# Patient Record
Sex: FEMALE | Race: WHITE | NOT HISPANIC OR LATINO | Employment: FULL TIME | URBAN - METROPOLITAN AREA
[De-identification: names, ages, dates, MRNs, and addresses within clinical notes are randomized per-mention and may not be internally consistent; named-entity substitution may affect disease eponyms.]

---

## 2020-07-14 ENCOUNTER — OFFICE VISIT (OUTPATIENT)
Dept: FAMILY MEDICINE | Facility: CLINIC | Age: 40
End: 2020-07-14
Payer: COMMERCIAL

## 2020-07-14 VITALS
WEIGHT: 275 LBS | BODY MASS INDEX: 41.68 KG/M2 | SYSTOLIC BLOOD PRESSURE: 138 MMHG | HEART RATE: 79 BPM | OXYGEN SATURATION: 99 % | HEIGHT: 68 IN | DIASTOLIC BLOOD PRESSURE: 88 MMHG | TEMPERATURE: 99 F

## 2020-07-14 DIAGNOSIS — Z12.4 SCREENING FOR MALIGNANT NEOPLASM OF CERVIX: ICD-10-CM

## 2020-07-14 DIAGNOSIS — Z11.3 SCREENING FOR STD (SEXUALLY TRANSMITTED DISEASE): ICD-10-CM

## 2020-07-14 DIAGNOSIS — Z00.00 PREVENTATIVE HEALTH CARE: Primary | ICD-10-CM

## 2020-07-14 PROCEDURE — 99385 PR PREVENTIVE VISIT,NEW,18-39: ICD-10-PCS | Mod: S$GLB,,, | Performed by: NURSE PRACTITIONER

## 2020-07-14 PROCEDURE — 99385 PREV VISIT NEW AGE 18-39: CPT | Mod: S$GLB,,, | Performed by: NURSE PRACTITIONER

## 2020-07-14 RX ORDER — CETIRIZINE HYDROCHLORIDE 10 MG/1
10 TABLET, CHEWABLE ORAL DAILY
COMMUNITY
End: 2021-12-21

## 2020-07-14 RX ORDER — BUDESONIDE AND FORMOTEROL FUMARATE DIHYDRATE 80; 4.5 UG/1; UG/1
2 AEROSOL RESPIRATORY (INHALATION) DAILY
COMMUNITY
Start: 2020-06-25

## 2020-07-14 RX ORDER — ALBUTEROL SULFATE 90 UG/1
2 AEROSOL, METERED RESPIRATORY (INHALATION) EVERY 4 HOURS PRN
COMMUNITY

## 2020-07-14 NOTE — PROGRESS NOTES
SUBJECTIVE:      Patient ID: Noemi Huang is a 40 y.o. female.    Chief Complaint: Establish Care (std testing)    New patient here to establish care with me today-she is well known to me from my previous clinic.  She has a history of asthma, bipolar 1 disorder and environmental allergies.  She is currently seeing a pulmonologist at  for management of her asthma and is well controlled on her regimen.  She is here today for annual exam and request for STD screening.  She just recently found out her  was unfaithful to her about 6 weeks ago and she is unsure if he use protection or not with his female partner.  She denies any current symptoms or complaints but just wants to be checked for reassurance.  She is also requesting a referral to gynecology to establish care and for screening Pap smear.  She denies any current medicine regimen for her bipolar disorder.  She stopped taking lithium at the end of  and is not currently desiring to restart any medications.  She is also due for routine lab work at this time.      Family History   Problem Relation Age of Onset    Diabetes Mother     Hypertension Father       Social History     Socioeconomic History    Marital status:      Spouse name: Not on file    Number of children: Not on file    Years of education: Not on file    Highest education level: Not on file   Occupational History    Not on file   Social Needs    Financial resource strain: Not on file    Food insecurity     Worry: Not on file     Inability: Not on file    Transportation needs     Medical: Not on file     Non-medical: Not on file   Tobacco Use    Smoking status: Former Smoker     Quit date: 2004     Years since quittin.0    Smokeless tobacco: Never Used   Substance and Sexual Activity    Alcohol use: Not Currently    Drug use: Never    Sexual activity: Not on file   Lifestyle    Physical activity     Days per week: Not on file     Minutes per session:  Not on file    Stress: Not on file   Relationships    Social connections     Talks on phone: Not on file     Gets together: Not on file     Attends Denominational service: Not on file     Active member of club or organization: Not on file     Attends meetings of clubs or organizations: Not on file     Relationship status: Not on file   Other Topics Concern    Not on file   Social History Narrative    Not on file     Current Outpatient Medications   Medication Sig Dispense Refill    albuterol (PROAIR HFA) 90 mcg/actuation inhaler ProAir HFA 90 mcg/actuation aerosol inhaler   Inhale 2 puffs every 4 hours by inhalation route as needed.      cetirizine 10 mg chewable tablet Take 10 mg by mouth once daily.      SYMBICORT 80-4.5 mcg/actuation HFAA INHALE 2 PUFFS BY MOTH TWO TIMES A DAY IN THE MORNING AND EVENING. RINSE MOUTH AND THROAT AFTER USE       No current facility-administered medications for this visit.      Review of patient's allergies indicates:   Allergen Reactions    Penicillins Anaphylaxis      Past Medical History:   Diagnosis Date    Allergy     Asthma      Past Surgical History:   Procedure Laterality Date     SECTION      KNEE SURGERY         Review of Systems   Constitutional: Negative for appetite change, chills, fatigue, fever and unexpected weight change.   HENT: Negative for congestion, ear discharge, ear pain, rhinorrhea, sore throat and trouble swallowing.    Eyes: Negative for pain and visual disturbance.   Respiratory: Positive for shortness of breath (intermittent SOB - has asthma ). Negative for cough, chest tightness and wheezing.    Cardiovascular: Negative for chest pain, palpitations and leg swelling.   Gastrointestinal: Negative for abdominal pain, blood in stool, constipation, diarrhea, nausea and vomiting.   Endocrine: Negative for cold intolerance, heat intolerance, polydipsia and polyuria.   Genitourinary: Negative for dysuria, frequency, genital sores, hematuria,  "menstrual problem, pelvic pain, urgency, vaginal bleeding and vaginal discharge.   Musculoskeletal: Negative for arthralgias, back pain, myalgias and neck pain.   Skin: Negative for pallor and rash.   Allergic/Immunologic: Positive for environmental allergies.   Neurological: Negative for dizziness, syncope, weakness and headaches.   Hematological: Negative for adenopathy. Does not bruise/bleed easily.   Psychiatric/Behavioral: Positive for dysphoric mood (hx of Bipolar d/o- not currently on meds ). Negative for sleep disturbance and suicidal ideas. The patient is not nervous/anxious.       OBJECTIVE:      Vitals:    07/14/20 1016   BP: 138/88   BP Location: Left arm   Patient Position: Sitting   BP Method: Large (Manual)   Pulse: 79   Temp: 98.7 °F (37.1 °C)   SpO2: 99%   Weight: 124.7 kg (275 lb)   Height: 5' 7.5" (1.715 m)     Physical Exam  Vitals signs and nursing note reviewed.   Constitutional:       General: She is not in acute distress.     Appearance: She is well-developed. She is obese. She is not diaphoretic.   HENT:      Head: Normocephalic and atraumatic.      Right Ear: Tympanic membrane, ear canal and external ear normal.      Left Ear: Tympanic membrane, ear canal and external ear normal.      Nose: Nose normal.      Mouth/Throat:      Mouth: Mucous membranes are moist.      Pharynx: Oropharynx is clear. No oropharyngeal exudate.   Eyes:      General: No scleral icterus.     Extraocular Movements: Extraocular movements intact.      Conjunctiva/sclera: Conjunctivae normal.      Pupils: Pupils are equal, round, and reactive to light.   Neck:      Musculoskeletal: Normal range of motion and neck supple. No neck rigidity.      Thyroid: No thyroid mass or thyromegaly.   Cardiovascular:      Rate and Rhythm: Normal rate and regular rhythm.      Heart sounds: Normal heart sounds. No murmur.   Pulmonary:      Effort: Pulmonary effort is normal. No respiratory distress.      Breath sounds: Normal breath " sounds. No wheezing or rales.   Abdominal:      General: Bowel sounds are normal. There is no distension.      Palpations: Abdomen is soft. There is no mass.      Tenderness: There is no abdominal tenderness.      Hernia: No hernia is present.   Genitourinary:     Comments: Deferred to gyn   Musculoskeletal: Normal range of motion.      Right lower leg: No edema.      Left lower leg: No edema.   Lymphadenopathy:      Cervical: No cervical adenopathy.   Skin:     General: Skin is warm and dry.      Coloration: Skin is not pale.      Findings: No rash.   Neurological:      General: No focal deficit present.      Mental Status: She is alert and oriented to person, place, and time.   Psychiatric:         Mood and Affect: Mood normal.         Behavior: Behavior normal.         Thought Content: Thought content normal.         Judgment: Judgment normal.        Assessment:       1. Preventative health care    2. Screening for STD (sexually transmitted disease)    3. Screening for malignant neoplasm of cervix    4. BMI 40.0-44.9, adult        Plan:       Preventative health care  -     CBC auto differential; Future; Expected date: 07/14/2020  -     Comprehensive metabolic panel; Future; Expected date: 07/14/2020  -     Lipid Panel; Future; Expected date: 07/14/2020  -     TSH; Future; Expected date: 07/14/2020    Screening for STD (sexually transmitted disease)  -     Hepatitis Panel, Acute; Future; Expected date: 07/14/2020  -     HIV 1/2 Ag/Ab (4th Gen); Future; Expected date: 07/14/2020  -     RPR; Future; Expected date: 07/14/2020  -     CT/NG, T. vaginalis; Future; Expected date: 07/14/2020    Screening for malignant neoplasm of cervix  -     Ambulatory referral/consult to Obstetrics / Gynecology; Future; Expected date: 07/21/2020    BMI 40.0-44.9, adult      *enouraged her to get established with a local counselor due to recent events in her marriage and to follow-up with me if she has any changes in her mental health      Follow up in about 1 year (around 7/14/2021) for annual .      7/14/2020 NILSON Alvarado, FNP

## 2020-07-14 NOTE — PATIENT INSTRUCTIONS
Adult Immunization Schedule  Vaccine How Often Disease Prevented Who Needs It   Influenza Every year Flu. This can be especially dangerous to elderly adults or people with immune disorders. All adults   Tetanus, diphtheria (Td); or Tetanus, diphtheria, and pertussis (Tdap)* One dose of Tdap, then one dose of Td as a booster every 10 years Tetanus (lockjaw), a disease that causes muscles to spasm  Diphtheria, an infection that causes fever, weakness, and breathing problems  Pertussis, also known as whooping cough. This is a highly contagious disease that can cause serious illness. All adults  *This vaccine should be given during each pregnancy no matter how many years since the last vaccine. The vaccine increases protection for your . A  is too young to get the vaccine, but at the highest risk for severe illness and death from pertussis.   Varicella (Kishan)** One series of 2 injections Chickenpox. This is a disease that causes itchy skin bumps, fever, and tiredness. It can lead to scarring, pneumonia, or brain inflammation. Adults who dont have evidence of immunity  **This vaccine should not be given to pregnant women. Women should avoid pregnancy for 4 weeks after the vaccine.   Human papillomavirus (HPV) One series of 3 injections Cervical cancer, caused by certain types of HPV  Vaginal and vulvar cancer  Penile cancer  Head and neck cancers  Anal cancer  Genital warts Females and males ages 15 to 26  Ask your healthcare provider if this vaccine is right for you.   Zoster--- 1 dose Herpes zoster (shingles), a painful rash marked by blisters Adults ages 60 and older.  ---You should not get this vaccine if your immune system is low. For example, if you have HIV, are taking medicines that suppress your immune system, or are getting cancer treatment.   Measles, mumps, rubella (MMR)** 1 or 2 doses, for ages 19 through 49; 1 dose for ages 50 and older if at risk Measles, a disease marked by red spots,  fever, and coughing  Mumps, a disease that causes swelling in the salivary glands. It may affect the ovaries or testes.  Rubella ([place]vian measles). This is a form of measles that can cause birth defects if a pregnant woman catches it. Adults born in 1957 or later who are not known to be immune to all 3 of these diseases. Ask your healthcare provider if you need a second dose.  **This vaccine should not be given to pregnant women. Women should avoid pregnancy for 4 weeks after vaccination.   Pneumococcal (PCV 13) 1 dose Pneumonia. This is an infection that causes inflammation in your lungs. It can lead to death. Adults ages 65 and older. Also, adults ages 19 and older with weak immune systems or any of these health conditions: chronic renal failure, nephrotic syndrome, functional or anatomic asplenia, cerebrospinal fluid (CSF) leaks, or cochlear implants. This vaccine adds extra protection to PCV 23 and should be given about 2 months before PCV 23.   Pneumococcal (PPSV23)  1 or 2 doses Pneumonia. This is an infection that causes inflammation in your lungs. It can lead to death. Adults ages 65 and older. Also, adults with chronic illnesses, such as asthma, COPD, heart disease, diabetes, liver disease, alcoholism, sickle cell disease, or history of splenectomy. In addition, adults with an immune disorder and adults who smoke cigarettes. This vaccine is recommended for all adults regardless of immune status.   Meningococcal  (MCV or MPSV) 1 or more doses Meningococcal disease (bacterial meningitis). This is inflammation of the membrane covering the brain and spinal cord. It can lead to death. Adults with immune deficiencies or high risk of exposure. Also, college freshmen living in dormitories and  recruits.   Hepatitis A (HepA) One series of 2 injections Hepatitis A. This is an infection that can result in acute liver inflammation and yellow skin and whites of the eyes (jaundice). Adults with risk factors, such  as clotting disorders or chronic liver disease, and adults with high risk of exposure. This includes men who have sex with men, IV drug users, and travelers to countries where hepatitis A is common.   Hepatitis B (HepB) One series of 3 injections Hepatitis B. This is an infection that causes chronic, severe liver disease. Adults with high risk of exposure, such as healthcare providers and sanitation workers, and adults with diabetes   Travelers diseases As needed Infections such as cholera, typhoid, yellow fever, polio, rabies, meningococcal disease, hepatitis A, hepatitis B Adults traveling out of the country. Required vaccines will vary, depending on the country you visit. Check the CDC website: www.cdc.gov.    ,  Based on the CDC National Immunization Program recommendations for adults.  Date Last Reviewed: 2/1/2017 © 2000-2017 [employer]. [address] [place] [address], [place] [address]. All rights reserved. This information is not intended as a substitute for professional medical care. Always follow your healthcare professional's instructions.        Prevention Guidelines, Women Ages 18 to 39  Screening tests and vaccines are an important part of managing your health. Health counseling is essential, too. Below are guidelines for these, for women ages 18 to 39. Talk with your healthcare provider to make sure youre up-to-date on what you need.  Screening Who needs it How often   Alcohol misuse All women in this age group At routine exams   Blood pressure All women in this age group Every 2 years if your blood pressure is less than 120/80 mm Hg; yearly if your systolic blood pressure is 120 to 139 mm Hg, or your diastolic blood pressure reading is 80 to 89 mm Hg   Breast cancer All women in this age group should talk with their healthcare providers about the need for clinical breast exams (CBE)1 Clinical breast exam every 3 years1   Cervical cancer Women ages 21 and older Women between ages 21 and 29  should have a Pap test every 3 years; women between ages 30 and 65 are advised to have a Pap test plus an HPV test every 5 years   Chlamydia Sexually active women ages 24 and younger, and women at increased risk for infection Every 3 years if you're at risk or have symptoms   Depression All women in this age group At routine exams   Diabetes mellitus, type 2 Adults with no symptoms who are overweight or obese and have 1 or more other risk factors for diabetes At least every 3 years   Gonorrhea Sexually active women at increased risk for infection At routine exams   Hepatitis C Anyone at increased risk At routine exams   HIV All women At routine exams   Obesity All women in this age group At routine exams   Syphilis Women at increased risk for infection - talk with your healthcare provider At routine exams   Tuberculosis Women at increased risk for infection - talk with your healthcare provider Ask your healthcare provider   Vision All women in this age group At least 1 complete exam in your 20s, and 2 in your 30s   Vaccine2 Who needs it How often   Chickenpox (varicella) All women in this age group who have no record of this infection or vaccine 2 doses; the second dose should be given 4 to 8 weeks after the first dose   Hepatitis A Women at increased risk for infection - talk with your healthcare provider 2 doses given at least 6 months apart   Hepatitis B Women at increased risk for infection - talk with your healthcare provider 3 doses over 6 months; second dose should be given 1 month after the first dose; the third dose should be given at least 2 months after the second dose and at least 4 months after the first dose   Haemophilus influenzae Type B (HIB) Women at increased risk for infection - talk with your healthcare provider 1 to 3 doses   Human papillomavirus (HPV) All women in this age group up to age 26 3 doses; the second dose should be given 1 to 2 months after the first dose and the third dose given 6  months after the first dose   Influenza (flu) All women in this age group Once a year   Measles, mumps, rubella (MMR) All women in this age group who have no record of these infections or vaccines 1 or 2 doses   Meningococcal Women at increased risk for infection - talk with your healthcare provider 1 or more doses   Pneumococcal conjugate vaccine (PCV13) and pneumococcal polysaccharide vaccine (PPSV23) Women at increased risk for infection - talk with your healthcare provider PCV13: 1 dose ages 19 to 65 (protects against 13 types of pneumococcal bacteria)  PPSV23: 1 to 2 doses through age 64, or 1 dose at 65 or older (protects against 23 types of pneumococcal bacteria)      Tetanus/diphtheria/pertussis (Td/Tdap) booster All women in this age group Td every 10 years, or a one-time dose of Tdap instead of a Td booster after age 18, then Td every 10 years   Counseling Who needs it How often   BRCA gene mutation testing for breast and ovarian cancer susceptibility Women with increased risk for having gene mutation When your risk is known   Breast cancer and chemoprevention Women at high risk for breast cancer When your risk is known   Diet and exercise Women who are overweight or obese When diagnosed, and then at routine exams   Domestic violence Women at the age in which they are able to have children At routine exams   Sexually transmitted infection prevention Women who are sexually active At routine exams   Skin cancer Prevention of skin cancer in fair-skinned adults through age 24 At routine exams   Use of tobacco and the health effects it can cause All women in this age group Every visit   1According to the ACS, women ages 20 to 39 years should have a clinical breast exam (CBE) as part of their routine health exam every 3 years. Breast self-exams are an option for women starting in their 20s. But the  USPSTF does not recommend CBE.  2Those who are 18 years old and not up-to-date on their childhood vaccines should  get all appropriate catch-up vaccines recommended by the CDC.  Date Last Reviewed: 8/27/2015  © 1260-9996 [employer]. [address] [place] [address], [place] [address]. All rights reserved. This information is not intended as a substitute for professional medical care. Always follow your healthcare professional's instructions.

## 2020-07-25 LAB
ALBUMIN SERPL-MCNC: 4.5 G/DL (ref 3.8–4.8)
ALBUMIN/GLOB SERPL: 2.1 {RATIO} (ref 1.2–2.2)
ALP SERPL-CCNC: 86 IU/L (ref 39–117)
ALT SERPL-CCNC: 10 IU/L (ref 0–32)
AST SERPL-CCNC: 13 IU/L (ref 0–40)
BASOPHILS # BLD AUTO: 0.1 X10E3/UL (ref 0–0.2)
BASOPHILS NFR BLD AUTO: 1 %
BILIRUB SERPL-MCNC: 0.2 MG/DL (ref 0–1.2)
BUN SERPL-MCNC: 14 MG/DL (ref 6–20)
BUN/CREAT SERPL: 19 (ref 9–23)
C TRACH RRNA SPEC QL NAA+PROBE: NEGATIVE
CALCIUM SERPL-MCNC: 9.4 MG/DL (ref 8.7–10.2)
CHLORIDE SERPL-SCNC: 100 MMOL/L (ref 96–106)
CHOLEST SERPL-MCNC: 182 MG/DL (ref 100–199)
CO2 SERPL-SCNC: 22 MMOL/L (ref 20–29)
CREAT SERPL-MCNC: 0.72 MG/DL (ref 0.57–1)
EOSINOPHIL # BLD AUTO: 0.1 X10E3/UL (ref 0–0.4)
EOSINOPHIL NFR BLD AUTO: 1 %
ERYTHROCYTE [DISTWIDTH] IN BLOOD BY AUTOMATED COUNT: 15.7 % (ref 11.7–15.4)
GLOBULIN SER CALC-MCNC: 2.1 G/DL (ref 1.5–4.5)
GLUCOSE SERPL-MCNC: 97 MG/DL (ref 65–99)
HAV IGM SERPL QL IA: NEGATIVE
HBV CORE IGM SERPL QL IA: NEGATIVE
HBV SURFACE AG SERPL QL IA: NEGATIVE
HCT VFR BLD AUTO: 36.8 % (ref 34–46.6)
HCV AB S/CO SERPL IA: <0.1 S/CO RATIO (ref 0–0.9)
HDLC SERPL-MCNC: 38 MG/DL
HGB BLD-MCNC: 11.7 G/DL (ref 11.1–15.9)
HIV 1+2 AB+HIV1 P24 AG SERPL QL IA: NON REACTIVE
IMM GRANULOCYTES # BLD AUTO: 0 X10E3/UL (ref 0–0.1)
IMM GRANULOCYTES NFR BLD AUTO: 0 %
LDLC SERPL CALC-MCNC: 121 MG/DL (ref 0–99)
LYMPHOCYTES # BLD AUTO: 2.7 X10E3/UL (ref 0.7–3.1)
LYMPHOCYTES NFR BLD AUTO: 34 %
MCH RBC QN AUTO: 24.7 PG (ref 26.6–33)
MCHC RBC AUTO-ENTMCNC: 31.8 G/DL (ref 31.5–35.7)
MCV RBC AUTO: 78 FL (ref 79–97)
MONOCYTES # BLD AUTO: 0.5 X10E3/UL (ref 0.1–0.9)
MONOCYTES NFR BLD AUTO: 7 %
N GONORRHOEA RRNA SPEC QL NAA+PROBE: NEGATIVE
NEUTROPHILS # BLD AUTO: 4.6 X10E3/UL (ref 1.4–7)
NEUTROPHILS NFR BLD AUTO: 57 %
PLATELET # BLD AUTO: 402 X10E3/UL (ref 150–450)
POTASSIUM SERPL-SCNC: 4.7 MMOL/L (ref 3.5–5.2)
PROT SERPL-MCNC: 6.6 G/DL (ref 6–8.5)
RBC # BLD AUTO: 4.73 X10E6/UL (ref 3.77–5.28)
RPR SER QL: NON REACTIVE
SODIUM SERPL-SCNC: 138 MMOL/L (ref 134–144)
T VAGINALIS DNA SPEC QL NAA+PROBE: NEGATIVE
TRIGL SERPL-MCNC: 113 MG/DL (ref 0–149)
TSH SERPL DL<=0.005 MIU/L-ACNC: 1.04 UIU/ML (ref 0.45–4.5)
VLDLC SERPL CALC-MCNC: 23 MG/DL (ref 5–40)
WBC # BLD AUTO: 8 X10E3/UL (ref 3.4–10.8)

## 2020-08-13 ENCOUNTER — HOSPITAL ENCOUNTER (OUTPATIENT)
Dept: RADIOLOGY | Facility: CLINIC | Age: 40
Discharge: HOME | End: 2020-08-13
Attending: PODIATRIST
Payer: COMMERCIAL

## 2020-08-13 ENCOUNTER — OFFICE VISIT (OUTPATIENT)
Dept: PODIATRY | Facility: CLINIC | Age: 40
End: 2020-08-13
Payer: COMMERCIAL

## 2020-08-13 VITALS
DIASTOLIC BLOOD PRESSURE: 78 MMHG | HEART RATE: 82 BPM | HEIGHT: 68 IN | SYSTOLIC BLOOD PRESSURE: 134 MMHG | BODY MASS INDEX: 41.68 KG/M2 | WEIGHT: 275 LBS | TEMPERATURE: 98 F

## 2020-08-13 DIAGNOSIS — S90.31XA CONTUSION OF RIGHT FOOT, INITIAL ENCOUNTER: ICD-10-CM

## 2020-08-13 DIAGNOSIS — M79.671 RIGHT FOOT PAIN: ICD-10-CM

## 2020-08-13 DIAGNOSIS — S99.921A INJURY OF RIGHT FOOT, INITIAL ENCOUNTER: Primary | ICD-10-CM

## 2020-08-13 DIAGNOSIS — S93.601A SPRAIN OF RIGHT FOOT, INITIAL ENCOUNTER: ICD-10-CM

## 2020-08-13 DIAGNOSIS — M72.2 PLANTAR FASCIITIS: ICD-10-CM

## 2020-08-13 PROCEDURE — 3008F BODY MASS INDEX DOCD: CPT | Mod: S$GLB,,, | Performed by: PODIATRIST

## 2020-08-13 PROCEDURE — 99203 OFFICE O/P NEW LOW 30 MIN: CPT | Mod: S$GLB,,, | Performed by: PODIATRIST

## 2020-08-13 PROCEDURE — 3008F PR BODY MASS INDEX (BMI) DOCUMENTED: ICD-10-PCS | Mod: S$GLB,,, | Performed by: PODIATRIST

## 2020-08-13 PROCEDURE — 73630 X-RAY EXAM OF FOOT: CPT | Mod: RT,S$GLB,, | Performed by: PODIATRIST

## 2020-08-13 PROCEDURE — 73630 XR FOOT COMPLETE 3 VIEW RIGHT: ICD-10-PCS | Mod: RT,S$GLB,, | Performed by: PODIATRIST

## 2020-08-13 PROCEDURE — 99203 PR OFFICE/OUTPT VISIT, NEW, LEVL III, 30-44 MIN: ICD-10-PCS | Mod: S$GLB,,, | Performed by: PODIATRIST

## 2020-08-13 RX ORDER — MELOXICAM 15 MG/1
15 TABLET ORAL DAILY
Qty: 30 TABLET | Refills: 1 | Status: SHIPPED | OUTPATIENT
Start: 2020-08-13 | End: 2021-12-21

## 2020-08-13 NOTE — PATIENT INSTRUCTIONS
Plantar Fasciitis  Plantar fasciitis is a painful swelling of the plantar fascia. The plantar fascia is a thick, fibrous layer of tissue. It covers the bones on the bottom of your foot. And it supports the foot bones in an arched position.  This can happen gradually or suddenly. It usually affects one foot at a time. Heel pain can be sharp, like a knife sticking into the bottom of your foot. You may feel pain after exercising, long-distance jogging, stair climbing, long periods of standing, or after standing up.  Risk factors include: non-active lifestyle, arthritis, diabetes, obesity or recent weight gain, flat foot, high arch. Wearing high heels, loose shoes, or shoes with poor arch support for long periods of time adds to the risk. This problem is commonly found in runners and dancers. It also found in people who stand on hard surfaces for long periods of time.  Foot pain from this condition is usually worse in the morning. But it improves with walking. By the end of the day there may be a dull aching. Treatment requires short-term rest and controlling swelling. It may take up to 9 months before all symptoms go away. Rarely, a steroid injection into the foot, or surgery, may be needed.  Home care  · If you are overweight, lose weight to help healing.  · Choose supportive shoes with good arch support and shock absorbency. Replace athletic shoes when they become worn out. Dont walk or run barefoot.  · Premade or custom-fitted shoe inserts may be helpful. Inserts made of silicone seem to be the most effective. Custom-made inserts can be provided by a podiatrist or foot specialist, physical therapist, or orthopedist.  · Premade or custom-made night splints keep the heel stretched out while you sleep. They may prevent morning pain.  · Avoid activities that stress the feet: jogging, prolonged standing or walking, contact sports, etc.  · First thing in the morning and before sports, stretch the bottom of your feet.  Gently flex your ankle so the toes move toward your knee.  · Icing may help control heel pain. Apply an ice pack to the heel for 10-20 minutes as a preventive. Or ice your heel after a severe flare-up of symptoms. You may repeat this every 1-2 hours as needed.  · You may use over-the-counter pain medicine to control pain, unless another medicine was prescribed. Anti-inflammatory pain medicines, such as ibuprofen or naproxen, may work better than acetaminophen. If you have chronic liver or kidney disease or ever had a stomach ulcer or GI bleeding, talk with your healthcare provider before using these medicines.  Follow-up care  Follow up with your healthcare provider, physical therapist, or podiatrist or foot specialist as advised.  Call for an appointment if pain worsens or there is no relief after a few weeks of home treatment. Shoe inserts, a night splint, or a special boot may be required.  If X-rays were taken, you will be told of any new findings that may affect your care.  When to seek medical advice  Call your healthcare provider right away if any of these occur:  · Foot swelling  · Redness with increasing pain  Date Last Reviewed: 11/21/2015  © 9734-7992 "[employer] [address] [place] [address], [place] [address]. All rights reserved. This information is not intended as a substitute for professional medical care. Always follow your healthcare professional's instructions.

## 2020-08-13 NOTE — PROGRESS NOTES
[address] [facility] [address]  [place] [address]  Phone: [phone]   [phone]    Patient's PCP:LAURENT Alvarado  Referring Provider: No ref. provider found    Subjective:      Chief Complaint:: Foot Injury (right foot)    MYLES Huang is a 40 y.o. female who presents with a complaint of  Right foot pain lasting for 4-5 weeks.Pain ball of foot and heel .Onset of the symptoms was stepped out of the shower and stepped on a conditioner bottle .  Current symptoms include pain and tingling top of foot.  Aggravating factors are standing and walking, and when I push off the wall when swimming. Symptoms have have improved and gotten worse .Treatment to date have included boot cast, rest , ice and Ibuprofen and wrapping. Patients rates pain  2/10 on pain scale. At its worse 7/10.      Vitals:    20 1410   BP: 134/78   Pulse: 82   Temp: 98.4 °F (36.9 °C)     Shoe Size: 10-10.5    Past Surgical History:   Procedure Laterality Date     SECTION      KNEE SURGERY       Past Medical History:   Diagnosis Date    Allergy     Asthma      Family History   Problem Relation Age of Onset    Diabetes Mother     Hypertension Father         Social History:   Marital Status:   Alcohol History:  reports previous alcohol use.  Tobacco History:  reports that she quit smoking about 16 years ago. She has never used smokeless tobacco.  Drug History:  reports no history of drug use.    Review of patient's allergies indicates:   Allergen Reactions    Penicillins Anaphylaxis       Current Outpatient Medications   Medication Sig Dispense Refill    albuterol (PROAIR HFA) 90 mcg/actuation inhaler ProAir HFA 90 mcg/actuation aerosol inhaler   Inhale 2 puffs every 4 hours by inhalation route as needed.      cetirizine 10 mg chewable tablet Take 10 mg by mouth once daily.      meloxicam (MOBIC) 15 MG tablet Take 1 tablet (15 mg total) by mouth once daily. 30 tablet 1    SYMBICORT 80-4.5  mcg/actuation HFAA INHALE 2 PUFFS BY MOTH TWO TIMES A DAY IN THE MORNING AND EVENING. RINSE MOUTH AND THROAT AFTER USE       No current facility-administered medications for this visit.        Review of Systems   Constitutional: Negative for chills, fatigue, fever and unexpected weight change.   HENT: Negative for hearing loss and trouble swallowing.    Eyes: Negative for photophobia and visual disturbance.   Respiratory: Negative for cough, shortness of breath and wheezing.    Cardiovascular: Negative for chest pain, palpitations and leg swelling.   Gastrointestinal: Negative for abdominal pain and nausea.   Genitourinary: Negative for dysuria and frequency.   Musculoskeletal: Positive for joint swelling and myalgias. Negative for arthralgias and back pain.   Skin: Negative for rash.   Neurological: Negative for tremors, seizures, weakness, numbness and headaches.   Hematological: Does not bruise/bleed easily.         Objective:        Physical Exam:   Foot Exam    General  General Appearance: appears stated age and healthy   Orientation: alert and oriented to person, place, and time   Affect: appropriate   Gait: antalgic       Right Foot/Ankle     Inspection and Palpation  Ecchymosis: none  Tenderness: calcaneus tenderness and plantar fascia (Lisfranc joint with range of motion.  Mild pain plantar 1st metatarsal)  Swelling: none   Arch: normal  Skin Exam: skin intact;     Neurovascular  Dorsalis pedis: 2+  Posterior tibial: 2+  Saphenous nerve sensation: normal  Tibial nerve sensation: normal  Superficial peroneal nerve sensation: normal  Deep peroneal nerve sensation: normal  Sural nerve sensation: normal    Muscle Strength  Ankle dorsiflexion: 5  Ankle plantar flexion: 5  Ankle inversion: 5  Ankle eversion: 5  Great toe extension: 5  Great toe flexion: 5    Range of Motion    Normal right ankle ROM      Left Foot/Ankle      Inspection and Palpation  Ecchymosis: none  Tenderness: none   Swelling: none   Arch:  normal  Hammertoes: absent  Claw toes: absent  Hallux valgus: no  Hallux limitus: no  Skin Exam: skin intact;     Neurovascular  Dorsalis pedis: 2+  Posterior tibial: 2+  Saphenous nerve sensation: normal  Tibial nerve sensation: normal  Superficial peroneal nerve sensation: normal  Deep peroneal nerve sensation: normal  Sural nerve sensation: normal    Muscle Strength  Ankle dorsiflexion: 5  Ankle plantar flexion: 5  Ankle inversion: 5  Ankle eversion: 5  Great toe extension: 5  Great toe flexion: 5    Range of Motion    Normal left ankle ROM          Physical Exam   Cardiovascular:   Pulses:       Dorsalis pedis pulses are 2+ on the right side and 2+ on the left side.        Posterior tibial pulses are 2+ on the right side and 2+ on the left side.   Musculoskeletal:      Left foot: No bunion.        Feet:        Imaging:    AP, lateral, lateral oblique weight-bearing x-rays right foot:  No acute fractures, no bone tumors, no soft tissue masses seen.  Inferior posterior calcaneal exostosis present.  Small exostosis lateral and plantar lateral calcaneus.  Exostosis 4th metatarsal base.         Assessment:       1. Injury of right foot, initial encounter    2. Right foot pain    3. Plantar fasciitis    4. Contusion of right foot, initial encounter    5. Sprain of right foot, initial encounter      Plan:   Injury of right foot, initial encounter  -     meloxicam (MOBIC) 15 MG tablet; Take 1 tablet (15 mg total) by mouth once daily.  Dispense: 30 tablet; Refill: 1    Right foot pain  -     X-Ray Foot Complete Right  -     meloxicam (MOBIC) 15 MG tablet; Take 1 tablet (15 mg total) by mouth once daily.  Dispense: 30 tablet; Refill: 1    Plantar fasciitis  -     meloxicam (MOBIC) 15 MG tablet; Take 1 tablet (15 mg total) by mouth once daily.  Dispense: 30 tablet; Refill: 1    Contusion of right foot, initial encounter    Sprain of right foot, initial encounter     1 evaluate the patient's crown my clinical findings and  x-ray findings to her.  I explained her believe she has soft tissue injury from stepping on the bottle when she stepped out of the shower.  I believe she is inflamed of plantar fascia and her plantar musculature.  She does have some pain and Lisfranc joint with some range of motion some mild changes on x-ray.  She does have pain at the insertion of the plantar fashion heel.  Decide to treat this as plantar fasciitis and will do plantar fasciitis level 1 treatment.  If she does not have good improvement in 4 weeks she return to see me for Cam Walker boot cast.  2.Plantar Fasciitis Level I Treatment:   Anti-inflammatory Mobic 15 mg 1 pill once a day  No bare feet  The patient has custom-made orthotics for from another podiatrist in [place] which she will use  Restrict activity level   Use running shoes at full time  No impact exercise and stretch gastrocnemius muscle  Return in 4 weeks if not at least 30-40% improvement.  Follow up if symptoms worsen or fail to improve.    Procedures - None    Counseling:   Discussed different treatment options for heel pain. I gave written and verbal instructions on heel cord stretching and this was demonstrated for the patient. Patient expressed understanding. Discussed wearing appropriate shoe gear and avoiding flats, slippers, sandals, barefoot, and sockfeet. Recommended arch supports. My recommendation for OTC supports is Spenco polysorb replacement insoles or patient may elect more aggressive treatment with prescription arch supports. We also discussed cortisone injections and NSAID therapy.     I provided patient education verbally regarding:   Patient diagnosis, treatment options, as well as alternatives, risks, and benefits.     This note was created using Dragon voice recognition software that occasionally misinterpreted phrases or words.               Answers for HPI/ROS submitted by the patient on 8/11/2020   Chronicity: recurrent  Onset: more than 1 month ago  Frequency:  daily  Progression since onset: waxing and waning  Aggravating factors: exertion, twisting, walking  Treatments tried: acetaminophen, ice, immobilization, rest  Improvement on treatment: mild

## 2021-04-27 ENCOUNTER — PATIENT MESSAGE (OUTPATIENT)
Dept: FAMILY MEDICINE | Facility: CLINIC | Age: 41
End: 2021-04-27

## 2021-12-21 ENCOUNTER — OFFICE VISIT (OUTPATIENT)
Dept: FAMILY MEDICINE | Facility: CLINIC | Age: 41
End: 2021-12-21
Payer: COMMERCIAL

## 2021-12-21 VITALS
WEIGHT: 283.63 LBS | TEMPERATURE: 98 F | HEART RATE: 72 BPM | RESPIRATION RATE: 16 BRPM | BODY MASS INDEX: 42.99 KG/M2 | HEIGHT: 68 IN | DIASTOLIC BLOOD PRESSURE: 90 MMHG | SYSTOLIC BLOOD PRESSURE: 152 MMHG | OXYGEN SATURATION: 99 %

## 2021-12-21 DIAGNOSIS — Z23 NEED FOR TETANUS BOOSTER: ICD-10-CM

## 2021-12-21 DIAGNOSIS — I10 ESSENTIAL HYPERTENSION: Primary | ICD-10-CM

## 2021-12-21 DIAGNOSIS — Z23 NEED FOR INFLUENZA VACCINATION: ICD-10-CM

## 2021-12-21 DIAGNOSIS — F41.9 ANXIETY DISORDER, UNSPECIFIED TYPE: ICD-10-CM

## 2021-12-21 PROCEDURE — 90686 FLU VACCINE (QUAD) GREATER THAN OR EQUAL TO 3YO PRESERVATIVE FREE IM: ICD-10-PCS | Mod: S$GLB,,, | Performed by: NURSE PRACTITIONER

## 2021-12-21 PROCEDURE — 90686 IIV4 VACC NO PRSV 0.5 ML IM: CPT | Mod: S$GLB,,, | Performed by: NURSE PRACTITIONER

## 2021-12-21 PROCEDURE — 99214 PR OFFICE/OUTPT VISIT, EST, LEVL IV, 30-39 MIN: ICD-10-PCS | Mod: 25,S$GLB,, | Performed by: NURSE PRACTITIONER

## 2021-12-21 PROCEDURE — 90471 IMMUNIZATION ADMIN: CPT | Mod: S$GLB,,, | Performed by: NURSE PRACTITIONER

## 2021-12-21 PROCEDURE — 90715 TDAP VACCINE 7 YRS/> IM: CPT | Mod: S$GLB,,, | Performed by: NURSE PRACTITIONER

## 2021-12-21 PROCEDURE — 90472 TDAP VACCINE GREATER THAN OR EQUAL TO 7YO IM: ICD-10-PCS | Mod: S$GLB,,, | Performed by: NURSE PRACTITIONER

## 2021-12-21 PROCEDURE — 90472 IMMUNIZATION ADMIN EACH ADD: CPT | Mod: S$GLB,,, | Performed by: NURSE PRACTITIONER

## 2021-12-21 PROCEDURE — 90471 FLU VACCINE (QUAD) GREATER THAN OR EQUAL TO 3YO PRESERVATIVE FREE IM: ICD-10-PCS | Mod: S$GLB,,, | Performed by: NURSE PRACTITIONER

## 2021-12-21 PROCEDURE — 4010F ACE/ARB THERAPY RXD/TAKEN: CPT | Mod: S$GLB,,, | Performed by: NURSE PRACTITIONER

## 2021-12-21 PROCEDURE — 4010F PR ACE/ARB THEARPY RXD/TAKEN: ICD-10-PCS | Mod: S$GLB,,, | Performed by: NURSE PRACTITIONER

## 2021-12-21 PROCEDURE — 90715 TDAP VACCINE GREATER THAN OR EQUAL TO 7YO IM: ICD-10-PCS | Mod: S$GLB,,, | Performed by: NURSE PRACTITIONER

## 2021-12-21 PROCEDURE — 99214 OFFICE O/P EST MOD 30 MIN: CPT | Mod: 25,S$GLB,, | Performed by: NURSE PRACTITIONER

## 2021-12-21 RX ORDER — BUSPIRONE HYDROCHLORIDE 7.5 MG/1
7.5 TABLET ORAL 2 TIMES DAILY
Qty: 60 TABLET | Refills: 2 | Status: SHIPPED | OUTPATIENT
Start: 2021-12-21 | End: 2022-01-21 | Stop reason: SDUPTHER

## 2021-12-21 RX ORDER — OLMESARTAN MEDOXOMIL 20 MG/1
20 TABLET ORAL DAILY
Qty: 30 TABLET | Refills: 2 | Status: SHIPPED | OUTPATIENT
Start: 2021-12-21 | End: 2022-01-21 | Stop reason: SDUPTHER

## 2021-12-21 RX ORDER — AZITHROMYCIN 250 MG/1
TABLET, FILM COATED ORAL
COMMUNITY
Start: 2021-12-13 | End: 2022-01-21

## 2021-12-30 LAB
ALBUMIN SERPL-MCNC: 3.8 G/DL (ref 3.6–5.1)
ALBUMIN/GLOB SERPL: 1.4 (CALC) (ref 1–2.5)
ALP SERPL-CCNC: 88 U/L (ref 31–125)
ALT SERPL-CCNC: 16 U/L (ref 6–29)
APPEARANCE UR: CLEAR
AST SERPL-CCNC: 20 U/L (ref 10–30)
BASOPHILS # BLD AUTO: 48 CELLS/UL (ref 0–200)
BASOPHILS NFR BLD AUTO: 0.5 %
BILIRUB SERPL-MCNC: 0.5 MG/DL (ref 0.2–1.2)
BILIRUB UR QL STRIP: NEGATIVE
BUN SERPL-MCNC: 12 MG/DL (ref 7–25)
BUN/CREAT SERPL: NORMAL (CALC) (ref 6–22)
CALCIUM SERPL-MCNC: 8.8 MG/DL (ref 8.6–10.2)
CHLORIDE SERPL-SCNC: 104 MMOL/L (ref 98–110)
CHOLEST SERPL-MCNC: 165 MG/DL
CHOLEST/HDLC SERPL: 3.5 (CALC)
CO2 SERPL-SCNC: 24 MMOL/L (ref 20–32)
COLOR UR: YELLOW
CREAT SERPL-MCNC: 0.71 MG/DL (ref 0.5–1.1)
EOSINOPHIL # BLD AUTO: 115 CELLS/UL (ref 15–500)
EOSINOPHIL NFR BLD AUTO: 1.2 %
ERYTHROCYTE [DISTWIDTH] IN BLOOD BY AUTOMATED COUNT: 14.6 % (ref 11–15)
GLOBULIN SER CALC-MCNC: 2.7 G/DL (CALC) (ref 1.9–3.7)
GLUCOSE SERPL-MCNC: 91 MG/DL (ref 65–99)
GLUCOSE UR QL STRIP: NEGATIVE
HCT VFR BLD AUTO: 35.8 % (ref 35–45)
HDLC SERPL-MCNC: 47 MG/DL
HGB BLD-MCNC: 11.6 G/DL (ref 11.7–15.5)
HGB UR QL STRIP: NEGATIVE
KETONES UR QL STRIP: NEGATIVE
LDLC SERPL CALC-MCNC: 96 MG/DL (CALC)
LEUKOCYTE ESTERASE UR QL STRIP: NEGATIVE
LYMPHOCYTES # BLD AUTO: 2803 CELLS/UL (ref 850–3900)
LYMPHOCYTES NFR BLD AUTO: 29.2 %
MCH RBC QN AUTO: 26.4 PG (ref 27–33)
MCHC RBC AUTO-ENTMCNC: 32.4 G/DL (ref 32–36)
MCV RBC AUTO: 81.4 FL (ref 80–100)
MONOCYTES # BLD AUTO: 634 CELLS/UL (ref 200–950)
MONOCYTES NFR BLD AUTO: 6.6 %
NEUTROPHILS # BLD AUTO: 6000 CELLS/UL (ref 1500–7800)
NEUTROPHILS NFR BLD AUTO: 62.5 %
NITRITE UR QL STRIP: NEGATIVE
NONHDLC SERPL-MCNC: 118 MG/DL (CALC)
PH UR STRIP: 6.5 [PH] (ref 5–8)
PLATELET # BLD AUTO: 360 THOUSAND/UL (ref 140–400)
PMV BLD REES-ECKER: 9.3 FL (ref 7.5–12.5)
POTASSIUM SERPL-SCNC: 4.4 MMOL/L (ref 3.5–5.3)
PROT SERPL-MCNC: 6.5 G/DL (ref 6.1–8.1)
PROT UR QL STRIP: NEGATIVE
RBC # BLD AUTO: 4.4 MILLION/UL (ref 3.8–5.1)
SODIUM SERPL-SCNC: 137 MMOL/L (ref 135–146)
SP GR UR STRIP: 1.01 (ref 1–1.03)
TRIGL SERPL-MCNC: 128 MG/DL
TSH SERPL-ACNC: 1.28 MIU/L
WBC # BLD AUTO: 9.6 THOUSAND/UL (ref 3.8–10.8)

## 2022-01-21 ENCOUNTER — OFFICE VISIT (OUTPATIENT)
Dept: FAMILY MEDICINE | Facility: CLINIC | Age: 42
End: 2022-01-21
Payer: COMMERCIAL

## 2022-01-21 VITALS
RESPIRATION RATE: 18 BRPM | TEMPERATURE: 98 F | OXYGEN SATURATION: 97 % | DIASTOLIC BLOOD PRESSURE: 80 MMHG | HEIGHT: 68 IN | SYSTOLIC BLOOD PRESSURE: 124 MMHG | HEART RATE: 82 BPM | WEIGHT: 285.13 LBS | BODY MASS INDEX: 43.21 KG/M2

## 2022-01-21 DIAGNOSIS — Z12.31 ENCOUNTER FOR SCREENING MAMMOGRAM FOR MALIGNANT NEOPLASM OF BREAST: ICD-10-CM

## 2022-01-21 DIAGNOSIS — I10 ESSENTIAL HYPERTENSION: Primary | ICD-10-CM

## 2022-01-21 DIAGNOSIS — F41.9 ANXIETY DISORDER, UNSPECIFIED TYPE: ICD-10-CM

## 2022-01-21 PROCEDURE — 1160F RVW MEDS BY RX/DR IN RCRD: CPT | Mod: S$GLB,,, | Performed by: NURSE PRACTITIONER

## 2022-01-21 PROCEDURE — 3079F PR MOST RECENT DIASTOLIC BLOOD PRESSURE 80-89 MM HG: ICD-10-PCS | Mod: S$GLB,,, | Performed by: NURSE PRACTITIONER

## 2022-01-21 PROCEDURE — 1160F PR REVIEW ALL MEDS BY PRESCRIBER/CLIN PHARMACIST DOCUMENTED: ICD-10-PCS | Mod: S$GLB,,, | Performed by: NURSE PRACTITIONER

## 2022-01-21 PROCEDURE — 3008F BODY MASS INDEX DOCD: CPT | Mod: S$GLB,,, | Performed by: NURSE PRACTITIONER

## 2022-01-21 PROCEDURE — 3074F SYST BP LT 130 MM HG: CPT | Mod: S$GLB,,, | Performed by: NURSE PRACTITIONER

## 2022-01-21 PROCEDURE — 99213 PR OFFICE/OUTPT VISIT, EST, LEVL III, 20-29 MIN: ICD-10-PCS | Mod: S$GLB,,, | Performed by: NURSE PRACTITIONER

## 2022-01-21 PROCEDURE — 99213 OFFICE O/P EST LOW 20 MIN: CPT | Mod: S$GLB,,, | Performed by: NURSE PRACTITIONER

## 2022-01-21 PROCEDURE — 3008F PR BODY MASS INDEX (BMI) DOCUMENTED: ICD-10-PCS | Mod: S$GLB,,, | Performed by: NURSE PRACTITIONER

## 2022-01-21 PROCEDURE — 3074F PR MOST RECENT SYSTOLIC BLOOD PRESSURE < 130 MM HG: ICD-10-PCS | Mod: S$GLB,,, | Performed by: NURSE PRACTITIONER

## 2022-01-21 PROCEDURE — 3079F DIAST BP 80-89 MM HG: CPT | Mod: S$GLB,,, | Performed by: NURSE PRACTITIONER

## 2022-01-21 RX ORDER — BUSPIRONE HYDROCHLORIDE 7.5 MG/1
7.5 TABLET ORAL 2 TIMES DAILY
Qty: 180 TABLET | Refills: 1 | Status: SHIPPED | OUTPATIENT
Start: 2022-01-21 | End: 2023-07-24

## 2022-01-21 RX ORDER — OLMESARTAN MEDOXOMIL 20 MG/1
20 TABLET ORAL DAILY
Qty: 90 TABLET | Refills: 1 | Status: SHIPPED | OUTPATIENT
Start: 2022-01-21 | End: 2022-07-21 | Stop reason: SDUPTHER

## 2022-01-21 NOTE — PROGRESS NOTES
SUBJECTIVE:      Patient ID: Noemi Huang is a 41 y.o. female.    Chief Complaint: Hypertension and Anxiety    Noemi is here for follow up on HTN and anxiety.  She is taking her medications as prescribed daily without side effects or complaints.  Blood pressure is well controlled today on evaluation.  She denies any headaches, shortness of breath, palpitations or chest pain.  She does feel like her anxiety is improving as well since starting the BuSpar twice daily.  Patient does have some occasional dizziness which she believes is related to the BuSpar but is tolerable at this time.  I have asked her to stay well hydrated and make sure to monitor her blood pressure at home.  Reviewed recent lab results.  Patient is trying to be more active and do more walking at home.    Hypertension  This is a chronic problem. The current episode started more than 1 month ago. The problem has been gradually improving since onset. The problem is controlled. Associated symptoms include anxiety. Pertinent negatives include no blurred vision, chest pain, headaches, malaise/fatigue, neck pain, palpitations, peripheral edema, shortness of breath or sweats. There are no associated agents to hypertension. Risk factors for coronary artery disease include family history, obesity, stress and smoking/tobacco exposure. Past treatments include lifestyle changes and angiotensin blockers. The current treatment provides moderate improvement. There are no compliance problems.  There is no history of a thyroid problem.   Anxiety  Presents for follow-up visit. Onset was 1 to 6 months ago. The problem has been gradually worsening. Symptoms include dizziness (occasional ) and nervous/anxious behavior. Patient reports no chest pain, confusion, decreased concentration, depressed mood, excessive worry, insomnia, nausea, palpitations, panic, shortness of breath or suicidal ideas. Symptoms occur occasionally. The severity of symptoms is mild. The  symptoms are aggravated by work stress and social activities. The quality of sleep is good. Nighttime awakenings: occasional.     Her past medical history is significant for anxiety/panic attacks and asthma. There is no history of depression. Past treatments include counseling (CBT) and lifestyle changes. The treatment provided no relief. Compliance with prior treatments has been good. Compliance with medications is %.       Family History   Problem Relation Age of Onset    Diabetes Mother     Hypertension Mother     Hypertension Father     Hyperlipidemia Maternal Grandmother     Hyperlipidemia Paternal Grandmother     Hyperlipidemia Paternal Grandfather       Social History     Socioeconomic History    Marital status:    Tobacco Use    Smoking status: Former Smoker     Quit date: 2004     Years since quittin.5    Smokeless tobacco: Never Used   Substance and Sexual Activity    Alcohol use: Not Currently    Drug use: Never    Sexual activity: Yes     Partners: Female     Birth control/protection: Partner-Vasectomy     Current Outpatient Medications   Medication Sig Dispense Refill    albuterol (PROVENTIL/VENTOLIN HFA) 90 mcg/actuation inhaler ProAir HFA 90 mcg/actuation aerosol inhaler   Inhale 2 puffs every 4 hours by inhalation route as needed.      SYMBICORT 80-4.5 mcg/actuation HFAA INHALE 2 PUFFS BY MOTH TWO TIMES A DAY IN THE MORNING AND EVENING. RINSE MOUTH AND THROAT AFTER USE      busPIRone (BUSPAR) 7.5 MG tablet Take 1 tablet (7.5 mg total) by mouth 2 (two) times a day. 180 tablet 1    olmesartan (BENICAR) 20 MG tablet Take 1 tablet (20 mg total) by mouth once daily. 90 tablet 1     No current facility-administered medications for this visit.     Review of patient's allergies indicates:   Allergen Reactions    Penicillins Anaphylaxis      Past Medical History:   Diagnosis Date    Allergy     Asthma      Past Surgical History:   Procedure Laterality Date     " SECTION      KNEE SURGERY  2002       Review of Systems   Constitutional: Negative for activity change, chills, fatigue, fever, malaise/fatigue and unexpected weight change.   HENT: Negative for hearing loss, rhinorrhea and trouble swallowing.    Eyes: Negative for blurred vision, discharge and visual disturbance.   Respiratory: Negative for cough, chest tightness, shortness of breath and wheezing.    Cardiovascular: Negative for chest pain, palpitations and leg swelling.   Gastrointestinal: Negative for abdominal pain, blood in stool, constipation, diarrhea, nausea and vomiting.   Endocrine: Negative for polydipsia and polyuria.   Genitourinary: Negative for difficulty urinating, dysuria, frequency, hematuria, menstrual problem and pelvic pain.   Musculoskeletal: Negative for arthralgias, joint swelling and neck pain.   Skin: Negative for pallor.   Neurological: Positive for dizziness (occasional ). Negative for weakness, numbness and headaches.   Hematological: Negative for adenopathy.   Psychiatric/Behavioral: Negative for confusion, decreased concentration, dysphoric mood, sleep disturbance and suicidal ideas. The patient is nervous/anxious. The patient does not have insomnia.       OBJECTIVE:      Vitals:    22 1037   BP: 124/80   BP Location: Left arm   Patient Position: Sitting   BP Method: Large (Manual)   Pulse: 82   Resp: 18   Temp: 98.3 °F (36.8 °C)   TempSrc: Oral   SpO2: 97%   Weight: 129.3 kg (285 lb 1.6 oz)   Height: 5' 7.5" (1.715 m)     Physical Exam  Vitals and nursing note reviewed.   Constitutional:       General: She is not in acute distress.     Appearance: Normal appearance. She is well-developed. She is obese. She is not ill-appearing or diaphoretic.   HENT:      Head: Normocephalic.   Eyes:      General: No scleral icterus.     Extraocular Movements: Extraocular movements intact.      Conjunctiva/sclera: Conjunctivae normal.      Pupils: Pupils are equal, round, and reactive " to light.   Neck:      Thyroid: No thyroid mass or thyromegaly.   Cardiovascular:      Rate and Rhythm: Normal rate and regular rhythm.      Heart sounds: Normal heart sounds. No murmur heard.      Pulmonary:      Effort: Pulmonary effort is normal.      Breath sounds: Normal breath sounds.   Genitourinary:     Comments:    Musculoskeletal:         General: Normal range of motion.      Cervical back: Normal range of motion and neck supple.      Right lower leg: No edema.      Left lower leg: No edema.   Lymphadenopathy:      Cervical: No cervical adenopathy.   Skin:     General: Skin is warm and dry.      Coloration: Skin is not jaundiced or pale.   Neurological:      Mental Status: She is alert and oriented to person, place, and time.   Psychiatric:         Mood and Affect: Mood normal.         Behavior: Behavior normal.         Thought Content: Thought content normal.         Judgment: Judgment normal.        Assessment:       1. Essential hypertension    2. Anxiety disorder, unspecified type    3. Encounter for screening mammogram for malignant neoplasm of breast        Plan:       Essential hypertension  -     olmesartan (BENICAR) 20 MG tablet; Take 1 tablet (20 mg total) by mouth once daily.  Dispense: 90 tablet; Refill: 1  -stable, cont meds and diet/exercise     Anxiety disorder, unspecified type  -     busPIRone (BUSPAR) 7.5 MG tablet; Take 1 tablet (7.5 mg total) by mouth 2 (two) times a day.  Dispense: 180 tablet; Refill: 1  -cont medication     Encounter for screening mammogram for malignant neoplasm of breast  -     Mammo Digital Screening Bilat w/ Thang; Future; Expected date: 01/21/2022        Follow up in about 6 months (around 7/21/2022) for annual, f/u HTN .      1/21/2022 Jessy Elliott, APRN, FNP    This note was created using [employer] voice recognition software that occasionally misinterprets phrases or words.

## 2022-01-24 ENCOUNTER — HOSPITAL ENCOUNTER (OUTPATIENT)
Dept: RADIOLOGY | Facility: HOSPITAL | Age: 42
Discharge: HOME | End: 2022-01-24
Attending: NURSE PRACTITIONER
Payer: COMMERCIAL

## 2022-01-24 DIAGNOSIS — Z12.31 ENCOUNTER FOR SCREENING MAMMOGRAM FOR MALIGNANT NEOPLASM OF BREAST: ICD-10-CM

## 2022-01-24 PROCEDURE — 77067 SCR MAMMO BI INCL CAD: CPT | Mod: TC,PO

## 2022-07-21 ENCOUNTER — OFFICE VISIT (OUTPATIENT)
Dept: FAMILY MEDICINE | Facility: CLINIC | Age: 42
End: 2022-07-21
Payer: COMMERCIAL

## 2022-07-21 VITALS
TEMPERATURE: 99 F | SYSTOLIC BLOOD PRESSURE: 116 MMHG | BODY MASS INDEX: 42.42 KG/M2 | WEIGHT: 279.88 LBS | RESPIRATION RATE: 20 BRPM | DIASTOLIC BLOOD PRESSURE: 72 MMHG | HEIGHT: 68 IN | HEART RATE: 75 BPM | OXYGEN SATURATION: 99 %

## 2022-07-21 DIAGNOSIS — I10 ESSENTIAL HYPERTENSION: Primary | ICD-10-CM

## 2022-07-21 DIAGNOSIS — Z80.0 FAMILY HISTORY OF ESOPHAGEAL CANCER: ICD-10-CM

## 2022-07-21 DIAGNOSIS — F41.9 ANXIETY DISORDER, UNSPECIFIED TYPE: ICD-10-CM

## 2022-07-21 DIAGNOSIS — K21.9 GASTROESOPHAGEAL REFLUX DISEASE WITHOUT ESOPHAGITIS: ICD-10-CM

## 2022-07-21 PROCEDURE — 1159F MED LIST DOCD IN RCRD: CPT | Mod: CPTII,S$GLB,, | Performed by: NURSE PRACTITIONER

## 2022-07-21 PROCEDURE — 3008F PR BODY MASS INDEX (BMI) DOCUMENTED: ICD-10-PCS | Mod: CPTII,S$GLB,, | Performed by: NURSE PRACTITIONER

## 2022-07-21 PROCEDURE — 99214 OFFICE O/P EST MOD 30 MIN: CPT | Mod: S$GLB,,, | Performed by: NURSE PRACTITIONER

## 2022-07-21 PROCEDURE — 3074F PR MOST RECENT SYSTOLIC BLOOD PRESSURE < 130 MM HG: ICD-10-PCS | Mod: CPTII,S$GLB,, | Performed by: NURSE PRACTITIONER

## 2022-07-21 PROCEDURE — 4010F ACE/ARB THERAPY RXD/TAKEN: CPT | Mod: CPTII,S$GLB,, | Performed by: NURSE PRACTITIONER

## 2022-07-21 PROCEDURE — 4010F PR ACE/ARB THEARPY RXD/TAKEN: ICD-10-PCS | Mod: CPTII,S$GLB,, | Performed by: NURSE PRACTITIONER

## 2022-07-21 PROCEDURE — 3078F PR MOST RECENT DIASTOLIC BLOOD PRESSURE < 80 MM HG: ICD-10-PCS | Mod: CPTII,S$GLB,, | Performed by: NURSE PRACTITIONER

## 2022-07-21 PROCEDURE — 3078F DIAST BP <80 MM HG: CPT | Mod: CPTII,S$GLB,, | Performed by: NURSE PRACTITIONER

## 2022-07-21 PROCEDURE — 3074F SYST BP LT 130 MM HG: CPT | Mod: CPTII,S$GLB,, | Performed by: NURSE PRACTITIONER

## 2022-07-21 PROCEDURE — 99214 PR OFFICE/OUTPT VISIT, EST, LEVL IV, 30-39 MIN: ICD-10-PCS | Mod: S$GLB,,, | Performed by: NURSE PRACTITIONER

## 2022-07-21 PROCEDURE — 3008F BODY MASS INDEX DOCD: CPT | Mod: CPTII,S$GLB,, | Performed by: NURSE PRACTITIONER

## 2022-07-21 PROCEDURE — 1159F PR MEDICATION LIST DOCUMENTED IN MEDICAL RECORD: ICD-10-PCS | Mod: CPTII,S$GLB,, | Performed by: NURSE PRACTITIONER

## 2022-07-21 PROCEDURE — 1160F RVW MEDS BY RX/DR IN RCRD: CPT | Mod: CPTII,S$GLB,, | Performed by: NURSE PRACTITIONER

## 2022-07-21 PROCEDURE — 1160F PR REVIEW ALL MEDS BY PRESCRIBER/CLIN PHARMACIST DOCUMENTED: ICD-10-PCS | Mod: CPTII,S$GLB,, | Performed by: NURSE PRACTITIONER

## 2022-07-21 RX ORDER — OLMESARTAN MEDOXOMIL 20 MG/1
20 TABLET ORAL DAILY
Qty: 90 TABLET | Refills: 1 | Status: SHIPPED | OUTPATIENT
Start: 2022-07-21 | End: 2023-01-23 | Stop reason: SDUPTHER

## 2022-07-21 RX ORDER — OMEPRAZOLE 20 MG/1
20 CAPSULE, DELAYED RELEASE ORAL DAILY
Qty: 30 CAPSULE | Refills: 5 | Status: SHIPPED | OUTPATIENT
Start: 2022-07-21 | End: 2023-01-23

## 2022-07-21 NOTE — PROGRESS NOTES
SUBJECTIVE:      Patient ID: Noemi Huang is a 42 y.o. female.    Chief Complaint: Anxiety and Hypertension    Noemi is here for follow up on HTN and anxiety.  She is taking her BP medication as prescribed daily without side effects or complaints. She stopped the Buspar- does not feel she needs it at this time. Blood pressure is well controlled today on evaluation.  She denies any headaches, shortness of breath, palpitations or chest pain. Patient is trying to be more active and do more walking at home. She is c/o more heartburn and acid reflux despite taking otc Prilosec daily. She has an extensive fam hx of Norris's esophagus and esophogeal cancer- requesting GI evaluation since she has never had an EGD.     Hypertension  This is a chronic problem. The current episode started more than 1 month ago. The problem has been gradually improving since onset. The problem is controlled. Associated symptoms include anxiety and malaise/fatigue. Pertinent negatives include no blurred vision, chest pain, headaches, neck pain, orthopnea, palpitations, peripheral edema, shortness of breath or sweats. There are no associated agents to hypertension. Risk factors for coronary artery disease include family history, obesity and stress. Past treatments include lifestyle changes. The current treatment provides moderate improvement. There are no compliance problems.  There is no history of a thyroid problem.   Gastroesophageal Reflux  She complains of belching, heartburn and a sore throat. She reports no abdominal pain, no chest pain, no choking, no coughing, no dysphagia, no early satiety, no globus sensation, no hoarse voice, no nausea, no stridor, no tooth decay, no water brash or no wheezing. This is a chronic problem. The current episode started more than 1 year ago. The problem occurs frequently. The problem has been waxing and waning. The heartburn does not wake her from sleep. The heartburn does not limit her activity.  The heartburn changes with position. The symptoms are aggravated by certain foods, stress and caffeine. Pertinent negatives include no anemia, fatigue, melena, orthopnea or weight loss. She has tried a PPI and a diet change for the symptoms. Past procedures do not include an EGD, H. pylori antibody titer or a UGI.   Anxiety  Presents for follow-up visit. Onset was 1 to 6 months ago. The problem has been gradually worsening. Patient reports no chest pain, confusion, decreased concentration, depressed mood, dizziness, excessive worry, insomnia, irritability, nausea, nervous/anxious behavior, palpitations, panic, shortness of breath or suicidal ideas. Symptoms occur rarely. The severity of symptoms is mild. The symptoms are aggravated by work stress and social activities. The quality of sleep is good. Nighttime awakenings: none.     Her past medical history is significant for anxiety/panic attacks and asthma. There is no history of depression. Past treatments include counseling (CBT) and lifestyle changes. The treatment provided no relief. Compliance with prior treatments has been good. Compliance with medications is 0-25%.       Family History   Problem Relation Age of Onset    Diabetes Mother     Hypertension Mother     Norris's esophagus Mother     Hypertension Father     Hyperlipidemia Maternal Grandmother     Hyperlipidemia Paternal Grandmother     Hyperlipidemia Paternal Grandfather     Esophageal cancer Maternal Uncle       Social History     Socioeconomic History    Marital status:    Tobacco Use    Smoking status: Former Smoker     Quit date: 2004     Years since quittin.0    Smokeless tobacco: Never Used   Substance and Sexual Activity    Alcohol use: Not Currently    Drug use: Never    Sexual activity: Yes     Partners: Female     Birth control/protection: Partner-Vasectomy     Current Outpatient Medications   Medication Sig Dispense Refill    albuterol (PROVENTIL/VENTOLIN  HFA) 90 mcg/actuation inhaler ProAir HFA 90 mcg/actuation aerosol inhaler   Inhale 2 puffs every 4 hours by inhalation route as needed.      spironolactone (ALDACTONE) 100 MG tablet 1/2 po daily x 3d then 1 po daily.  Do not take if you are or may be pregnant (Patient taking differently: Take 100 mg by mouth once daily. 1/2 po daily x 3d then 1 po daily.  Do not take if you are or may be pregnant) 30 tablet 11    SYMBICORT 80-4.5 mcg/actuation HFAA INHALE 2 PUFFS BY MOTH TWO TIMES A DAY IN THE MORNING AND EVENING. RINSE MOUTH AND THROAT AFTER USE      busPIRone (BUSPAR) 7.5 MG tablet Take 1 tablet (7.5 mg total) by mouth 2 (two) times a day. (Patient not taking: Reported on 2022) 180 tablet 1    olmesartan (BENICAR) 20 MG tablet Take 1 tablet (20 mg total) by mouth once daily. 90 tablet 1    omeprazole (PRILOSEC) 20 MG capsule Take 1 capsule (20 mg total) by mouth once daily. 30 capsule 5     No current facility-administered medications for this visit.     Review of patient's allergies indicates:   Allergen Reactions    Penicillins Anaphylaxis      Past Medical History:   Diagnosis Date    Allergy     Asthma      Past Surgical History:   Procedure Laterality Date     SECTION      DILATION AND CURETTAGE OF UTERUS  2022    KNEE SURGERY         Review of Systems   Constitutional: Positive for malaise/fatigue. Negative for activity change, chills, fatigue, fever, irritability, unexpected weight change and weight loss.   HENT: Positive for sore throat. Negative for congestion, hearing loss, hoarse voice, rhinorrhea and trouble swallowing.    Eyes: Negative for blurred vision, discharge and visual disturbance.   Respiratory: Negative for cough, choking, chest tightness, shortness of breath and wheezing.    Cardiovascular: Negative for chest pain, palpitations, orthopnea and leg swelling.   Gastrointestinal: Positive for heartburn. Negative for abdominal pain, blood in stool, constipation,  "diarrhea, dysphagia, melena, nausea and vomiting.        GERD- uncontrolled    Endocrine: Negative for polydipsia and polyuria.   Genitourinary: Negative for difficulty urinating, dysuria, frequency, hematuria, menstrual problem and pelvic pain.   Musculoskeletal: Negative for arthralgias, joint swelling and neck pain.   Skin: Negative for pallor.   Neurological: Negative for dizziness, weakness, numbness and headaches.   Hematological: Negative for adenopathy.   Psychiatric/Behavioral: Negative for confusion, decreased concentration, dysphoric mood, sleep disturbance and suicidal ideas. The patient is not nervous/anxious and does not have insomnia.       OBJECTIVE:      Vitals:    07/21/22 1102   BP: 116/72   BP Location: Left arm   Patient Position: Sitting   BP Method: Large (Manual)   Pulse: 75   Resp: 20   Temp: 98.5 °F (36.9 °C)   TempSrc: Oral   SpO2: 99%   Weight: 127 kg (279 lb 14.4 oz)   Height: 5' 7.5" (1.715 m)     Physical Exam  Vitals and nursing note reviewed.   Constitutional:       General: She is not in acute distress.     Appearance: Normal appearance. She is well-developed. She is obese. She is not ill-appearing or diaphoretic.   HENT:      Head: Normocephalic.      Mouth/Throat:      Mouth: Mucous membranes are moist.   Eyes:      General: No scleral icterus.     Pupils: Pupils are equal, round, and reactive to light.   Neck:      Thyroid: No thyroid mass or thyromegaly.   Cardiovascular:      Rate and Rhythm: Normal rate and regular rhythm.      Heart sounds: Normal heart sounds. No murmur heard.  Pulmonary:      Effort: Pulmonary effort is normal. No respiratory distress.      Breath sounds: Normal breath sounds. No wheezing, rhonchi or rales.   Abdominal:      General: Bowel sounds are normal.      Palpations: Abdomen is soft.      Tenderness: There is no abdominal tenderness.   Genitourinary:     Comments:    Musculoskeletal:         General: Normal range of motion.      Cervical back: " Normal range of motion and neck supple.      Right lower leg: No edema.      Left lower leg: No edema.   Lymphadenopathy:      Cervical: No cervical adenopathy.   Skin:     General: Skin is warm and dry.      Coloration: Skin is not jaundiced or pale.   Neurological:      Mental Status: She is alert and oriented to person, place, and time.   Psychiatric:         Mood and Affect: Mood normal.         Behavior: Behavior normal.         Thought Content: Thought content normal.         Judgment: Judgment normal.        Assessment:       1. Essential hypertension    2. Anxiety disorder, unspecified type    3. Gastroesophageal reflux disease without esophagitis    4. Family history of esophageal cancer        Plan:       Essential hypertension  -     olmesartan (BENICAR) 20 MG tablet; Take 1 tablet (20 mg total) by mouth once daily.  Dispense: 90 tablet; Refill: 1  -stable    Anxiety disorder, unspecified type   -stable off meds    Gastroesophageal reflux disease without esophagitis  -     omeprazole (PRILOSEC) 20 MG capsule; Take 1 capsule (20 mg total) by mouth once daily.  Dispense: 30 capsule; Refill: 5  -     Ambulatory referral/consult to Gastroenterology; Future; Expected date: 07/28/2022  -increase PPI to 20 mg daily; see GI for EGD eval     Family history of esophageal cancer  -     Ambulatory referral/consult to Gastroenterology; Future; Expected date: 07/28/2022        Follow up in about 6 months (around 1/21/2023) for annual .      7/21/2022 NILSON Alvarado, FNP    This note was created using [employer] voice recognition software that occasionally misinterprets phrases or words.

## 2022-07-27 DIAGNOSIS — R10.13 ABDOMINAL PAIN, EPIGASTRIC: ICD-10-CM

## 2022-07-27 DIAGNOSIS — K21.9 GERD (GASTROESOPHAGEAL REFLUX DISEASE): Primary | ICD-10-CM

## 2022-08-09 ENCOUNTER — HOSPITAL ENCOUNTER (OUTPATIENT)
Dept: RADIOLOGY | Facility: HOSPITAL | Age: 42
Discharge: HOME | End: 2022-08-09
Attending: INTERNAL MEDICINE
Payer: COMMERCIAL

## 2022-08-09 DIAGNOSIS — R10.13 ABDOMINAL PAIN, EPIGASTRIC: ICD-10-CM

## 2022-08-09 DIAGNOSIS — K21.9 GERD (GASTROESOPHAGEAL REFLUX DISEASE): ICD-10-CM

## 2022-08-09 PROCEDURE — 76705 ECHO EXAM OF ABDOMEN: CPT | Mod: TC,PO

## 2022-10-20 ENCOUNTER — ANESTHESIA EVENT (OUTPATIENT)
Dept: SURGERY | Facility: HOSPITAL | Age: 42
End: 2022-10-20
Payer: COMMERCIAL

## 2022-10-20 ENCOUNTER — HOSPITAL ENCOUNTER (OUTPATIENT)
Facility: HOSPITAL | Age: 42
Discharge: ANOTHER HOSPITAL | End: 2022-10-20
Attending: INTERNAL MEDICINE | Admitting: INTERNAL MEDICINE
Payer: COMMERCIAL

## 2022-10-20 ENCOUNTER — ANESTHESIA (OUTPATIENT)
Dept: SURGERY | Facility: HOSPITAL | Age: 42
End: 2022-10-20
Payer: COMMERCIAL

## 2022-10-20 VITALS
SYSTOLIC BLOOD PRESSURE: 116 MMHG | RESPIRATION RATE: 16 BRPM | DIASTOLIC BLOOD PRESSURE: 61 MMHG | OXYGEN SATURATION: 98 % | TEMPERATURE: 97 F | HEART RATE: 68 BPM

## 2022-10-20 DIAGNOSIS — R10.9 ABDOMINAL PAIN: Primary | ICD-10-CM

## 2022-10-20 PROCEDURE — 43264 ERCP REMOVE DUCT CALCULI: CPT | Performed by: INTERNAL MEDICINE

## 2022-10-20 PROCEDURE — 25000003 PHARM REV CODE 250: Performed by: NURSE ANESTHETIST, CERTIFIED REGISTERED

## 2022-10-20 PROCEDURE — 37000009 HC ANESTHESIA EA ADD 15 MINS: Performed by: INTERNAL MEDICINE

## 2022-10-20 PROCEDURE — 27202107 HC XP QUATRO SENSOR: Performed by: ANESTHESIOLOGY

## 2022-10-20 PROCEDURE — 43274 ERCP DUCT STENT PLACEMENT: CPT | Performed by: INTERNAL MEDICINE

## 2022-10-20 PROCEDURE — 63600175 PHARM REV CODE 636 W HCPCS: Performed by: ANESTHESIOLOGY

## 2022-10-20 PROCEDURE — C2625 STENT, NON-COR, TEM W/DEL SY: HCPCS | Performed by: INTERNAL MEDICINE

## 2022-10-20 PROCEDURE — 27201107 HC STYLET, STANDARD: Performed by: ANESTHESIOLOGY

## 2022-10-20 PROCEDURE — 43262 ENDO CHOLANGIOPANCREATOGRAPH: CPT | Mod: 59 | Performed by: INTERNAL MEDICINE

## 2022-10-20 PROCEDURE — 27000673 HC TUBING BLOOD Y: Performed by: ANESTHESIOLOGY

## 2022-10-20 PROCEDURE — C1769 GUIDE WIRE: HCPCS | Performed by: INTERNAL MEDICINE

## 2022-10-20 PROCEDURE — 63600175 PHARM REV CODE 636 W HCPCS: Performed by: NURSE ANESTHETIST, CERTIFIED REGISTERED

## 2022-10-20 PROCEDURE — 27000656 HC EYE GOGGLES: Performed by: ANESTHESIOLOGY

## 2022-10-20 PROCEDURE — 37000008 HC ANESTHESIA 1ST 15 MINUTES: Performed by: INTERNAL MEDICINE

## 2022-10-20 PROCEDURE — 27000671 HC TUBING MICROBORE EXT: Performed by: ANESTHESIOLOGY

## 2022-10-20 PROCEDURE — 27000284 HC CANNULA NASAL: Performed by: ANESTHESIOLOGY

## 2022-10-20 RX ORDER — FENTANYL CITRATE 50 UG/ML
25 INJECTION, SOLUTION INTRAMUSCULAR; INTRAVENOUS EVERY 5 MIN PRN
Status: DISCONTINUED | OUTPATIENT
Start: 2022-10-20 | End: 2022-10-20 | Stop reason: HOSPADM

## 2022-10-20 RX ORDER — SODIUM CHLORIDE 0.9 % (FLUSH) 0.9 %
10 SYRINGE (ML) INJECTION
Status: DISCONTINUED | OUTPATIENT
Start: 2022-10-20 | End: 2022-10-20 | Stop reason: HOSPADM

## 2022-10-20 RX ORDER — PROPOFOL 10 MG/ML
VIAL (ML) INTRAVENOUS
Status: DISCONTINUED | OUTPATIENT
Start: 2022-10-20 | End: 2022-10-20

## 2022-10-20 RX ORDER — FENTANYL CITRATE 50 UG/ML
INJECTION, SOLUTION INTRAMUSCULAR; INTRAVENOUS
Status: DISCONTINUED | OUTPATIENT
Start: 2022-10-20 | End: 2022-10-20

## 2022-10-20 RX ORDER — SUCCINYLCHOLINE CHLORIDE 20 MG/ML
INJECTION INTRAMUSCULAR; INTRAVENOUS
Status: DISCONTINUED | OUTPATIENT
Start: 2022-10-20 | End: 2022-10-20

## 2022-10-20 RX ORDER — LIDOCAINE HYDROCHLORIDE 20 MG/ML
JELLY TOPICAL
Status: DISCONTINUED | OUTPATIENT
Start: 2022-10-20 | End: 2022-10-20

## 2022-10-20 RX ORDER — SODIUM CHLORIDE, SODIUM LACTATE, POTASSIUM CHLORIDE, CALCIUM CHLORIDE 600; 310; 30; 20 MG/100ML; MG/100ML; MG/100ML; MG/100ML
INJECTION, SOLUTION INTRAVENOUS CONTINUOUS PRN
Status: DISCONTINUED | OUTPATIENT
Start: 2022-10-20 | End: 2022-10-20

## 2022-10-20 RX ORDER — ONDANSETRON 2 MG/ML
INJECTION INTRAMUSCULAR; INTRAVENOUS
Status: DISCONTINUED | OUTPATIENT
Start: 2022-10-20 | End: 2022-10-20

## 2022-10-20 RX ORDER — MIDAZOLAM HYDROCHLORIDE 1 MG/ML
INJECTION INTRAMUSCULAR; INTRAVENOUS
Status: DISCONTINUED | OUTPATIENT
Start: 2022-10-20 | End: 2022-10-20

## 2022-10-20 RX ORDER — LIDOCAINE HYDROCHLORIDE 20 MG/ML
INJECTION, SOLUTION EPIDURAL; INFILTRATION; INTRACAUDAL; PERINEURAL
Status: DISCONTINUED | OUTPATIENT
Start: 2022-10-20 | End: 2022-10-20

## 2022-10-20 RX ORDER — ONDANSETRON 2 MG/ML
4 INJECTION INTRAMUSCULAR; INTRAVENOUS DAILY PRN
Status: DISCONTINUED | OUTPATIENT
Start: 2022-10-20 | End: 2022-10-20 | Stop reason: HOSPADM

## 2022-10-20 RX ADMIN — SODIUM CHLORIDE, SODIUM LACTATE, POTASSIUM CHLORIDE, AND CALCIUM CHLORIDE: .6; .31; .03; .02 INJECTION, SOLUTION INTRAVENOUS at 03:10

## 2022-10-20 RX ADMIN — SODIUM CHLORIDE, SODIUM LACTATE, POTASSIUM CHLORIDE, AND CALCIUM CHLORIDE: .6; .31; .03; .02 INJECTION, SOLUTION INTRAVENOUS at 02:10

## 2022-10-20 RX ADMIN — PROPOFOL 150 MG: 10 INJECTION, EMULSION INTRAVENOUS at 03:10

## 2022-10-20 RX ADMIN — FENTANYL CITRATE 50 MCG: 50 INJECTION INTRAMUSCULAR; INTRAVENOUS at 02:10

## 2022-10-20 RX ADMIN — ONDANSETRON 4 MG: 2 INJECTION INTRAMUSCULAR; INTRAVENOUS at 02:10

## 2022-10-20 RX ADMIN — LIDOCAINE HYDROCHLORIDE 60 MG: 20 INJECTION, SOLUTION EPIDURAL; INFILTRATION; INTRACAUDAL; PERINEURAL at 03:10

## 2022-10-20 RX ADMIN — FENTANYL CITRATE 25 MCG: 50 INJECTION INTRAMUSCULAR; INTRAVENOUS at 04:10

## 2022-10-20 RX ADMIN — PROPOFOL 50 MG: 10 INJECTION, EMULSION INTRAVENOUS at 03:10

## 2022-10-20 RX ADMIN — LIDOCAINE HYDROCHLORIDE 4 ML: 20 JELLY TOPICAL at 03:10

## 2022-10-20 RX ADMIN — SUCCINYLCHOLINE CHLORIDE 120 MG: 20 INJECTION, SOLUTION INTRAMUSCULAR; INTRAVENOUS at 03:10

## 2022-10-20 RX ADMIN — MIDAZOLAM HYDROCHLORIDE 1 MG: 1 INJECTION, SOLUTION INTRAMUSCULAR; INTRAVENOUS at 02:10

## 2022-10-20 NOTE — PROGRESS NOTES
Patient arrived to endo unit post release from PACU, aaox4, no complaints at present time , denies pain, assigned nurse f(Darleen) [facility] at bedside, Transportation via [facility] requested, [facility] EMT arrived at 1730 , patient discahrged from endo unit with complaints and without incident accompanied by [facility] nurse and emt]

## 2022-10-20 NOTE — DISCHARGE INSTRUCTIONS
No driving for 24 hours  No alcohol for 24 hours  Do not make any critical decisions or sign legal documents until tomorrow..    Follow up in 2-3 months with Dr Gilmore for stent removal. [phone]

## 2022-10-20 NOTE — PROVATION PATIENT INSTRUCTIONS
Discharge Summary/Instructions after an Endoscopic Procedure  Patient Name: Noemi Huang  Patient MRN: [id number]  Patient YOB: 1980 Thursday, October 20, 2022  Markel Gilmore III, MD  RESTRICTIONS:  During your procedure today, you received medications for sedation.  These   medications may affect your judgment, balance and coordination.  Therefore,   for 24 hours, you have the following restrictions:   - DO NOT drive a car, operate machinery, make legal/financial decisions,   sign important papers or drink alcohol.    ACTIVITY:  Today: no heavy lifting, straining or running due to procedural   sedation/anesthesia.  The following day: return to full activity including work.  DIET:  Eat and drink normally unless instructed otherwise.     TREATMENT FOR COMMON SIDE EFFECTS:  - Mild abdominal pain, nausea, belching, bloating or excessive gas:  rest,   eat lightly and use a heating pad.  - Sore Throat: treat with throat lozenges and/or gargle with warm salt   water.  - Because air was used during the procedure, expelling large amounts of air   from your rectum or belching is normal.  - If a bowel prep was taken, you may not have a bowel movement for 1-3 days.    This is normal.  SYMPTOMS TO WATCH FOR AND REPORT TO YOUR PHYSICIAN:  1. Abdominal pain or bloating, other than gas cramps.  2. Chest pain.  3. Back pain.  4. Signs of infection such as: chills or fever occurring within 24 hours   after the procedure.  5. Rectal bleeding, which would show as bright red, maroon, or black stools.   (A tablespoon of blood from the rectum is not serious, especially if   hemorrhoids are present.)  6. Vomiting.  7. Weakness or dizziness.  GO DIRECTLY TO THE NEAREST EMERGENCY ROOM IF YOU HAVE ANY OF THE FOLLOWING:      Difficulty breathing              Chills and/or fever over 101 F   Persistent vomiting and/or vomiting blood   Severe abdominal pain   Severe chest pain   Black, tarry stools   Bleeding- more than one  tablespoon   Any other symptom or condition that you feel may need urgent attention  Your doctor recommends these additional instructions:  If any biopsies were taken, your doctors clinic will contact you in 1 to 2   weeks with any results.  - Advance diet as tolerated.   - Continue present medications.   - Repeat ERCP in 10 weeks to remove stent.   - Return patient to referring hospital for ongoing care.  For questions, problems or results please call your physician - Markel Gilmore III, MD at Work:  [phone].  [facility], EMERGENCY ROOM PHONE NUMBER: [phone]  IF A COMPLICATION OR EMERGENCY SITUATION ARISES AND YOU ARE UNABLE TO REACH   YOUR PHYSICIAN - GO DIRECTLY TO THE EMERGENCY ROOM.  Markel Gilmore III, MD  10/20/2022 3:47:56 PM  This report has been verified and signed electronically.  Dear patient,  As a result of recent federal legislation (The Federal Cures Act), you may   receive lab or pathology results from your procedure in your [facility]   account before your physician is able to contact you. Your physician or   their representative will relay the results to you with their   recommendations at their soonest availability.  Thank you,  PROVATION

## 2022-10-20 NOTE — TRANSFER OF CARE
Anesthesia Transfer of Care Note    Patient: Noemi Huang    Procedure(s) Performed: Procedure(s) (LRB):  ERCP (ENDOSCOPIC RETROGRADE CHOLANGIOPANCREATOGRAPHY) (N/A)    Patient location: PACU    Anesthesia Type: general    Transport from OR: Transported from OR on 2-3 L/min O2 by NC with adequate spontaneous ventilation    Post pain: adequate analgesia    Post assessment: no apparent anesthetic complications    Post vital signs: stable    Level of consciousness: awake and alert    Nausea/Vomiting: no nausea/vomiting    Complications: none    Transfer of care protocol was followed      Last vitals:   Visit Vitals  BP (!) 113/55 (BP Location: Left arm, Patient Position: Lying)   Pulse 74   Temp 36.8 °C (98.2 °F) (Oral)   Resp 18   LMP 10/13/2022   SpO2 97%   Breastfeeding No

## 2022-10-20 NOTE — ANESTHESIA PREPROCEDURE EVALUATION
10/20/2022  Noemi Huang is a 42 y.o., female.      Pre-op Assessment    I have reviewed the Patient Summary Reports.     I have reviewed the Nursing Notes. I have reviewed the NPO Status.   I have reviewed the Medications.     Review of Systems  Anesthesia Hx:  No problems with previous Anesthesia Denies Hx of Anesthetic complications  Neg history of prior surgery. Denies Family Hx of Anesthesia complications.   Denies Personal Hx of Anesthesia complications.   Social:  Former Smoker, No Alcohol Use    Hematology/Oncology:  Hematology Normal   Oncology Normal     EENT/Dental:EENT/Dental Normal   Cardiovascular:  Cardiovascular Normal     Pulmonary:   Asthma mild    Renal/:  Renal/ Normal     Hepatic/GI:  Hepatic/GI Normal    Musculoskeletal:  Musculoskeletal Normal    Neurological:  Neurology Normal    Endocrine:  Endocrine Normal    Dermatological:  Skin Normal    Psych:  Psychiatric Normal           Physical Exam  General: Well nourished and Alert    Airway:  Mallampati: II   Mouth Opening: Normal  TM Distance: Normal  Tongue: Normal  Neck ROM: Normal ROM    Dental:  Intact    Chest/Lungs:  Clear to auscultation, Normal Respiratory Rate    Heart:  Rate: Normal  Rhythm: Regular Rhythm  Sounds: Normal        Anesthesia Plan  Type of Anesthesia, risks & benefits discussed:    Anesthesia Type: Gen ETT  Intra-op Monitoring Plan: Standard ASA Monitors  Post Op Pain Control Plan: multimodal analgesia  Induction:  IV  Airway Plan: Video, Post-Induction  Informed Consent: Informed consent signed with the Patient and all parties understand the risks and agree with anesthesia plan.  All questions answered. Patient consented to blood products? Yes  ASA Score: 2 Emergent    Ready For Surgery From Anesthesia Perspective.     .

## 2022-10-20 NOTE — ANESTHESIA POSTPROCEDURE EVALUATION
Anesthesia Post Evaluation    Patient: Noemi Huang    Procedure(s) Performed: Procedure(s) (LRB):  ERCP (ENDOSCOPIC RETROGRADE CHOLANGIOPANCREATOGRAPHY) (N/A)    Final Anesthesia Type: general      Patient location during evaluation: PACU  Patient participation: Yes- Able to Participate  Level of consciousness: awake and alert and oriented  Post-procedure vital signs: reviewed and stable  Pain management: adequate  Airway patency: patent    PONV status at discharge: No PONV  Anesthetic complications: no      Cardiovascular status: blood pressure returned to baseline, hemodynamically stable and stable  Respiratory status: unassisted, spontaneous ventilation and room air  Hydration status: euvolemic  Follow-up not needed.          Vitals Value Taken Time   /61 10/20/22 1630   Temp 36.2 °C (97.1 °F) 10/20/22 1549   Pulse 91 10/20/22 1642   Resp 16 10/20/22 1615   SpO2 100 % 10/20/22 1642   Vitals shown include unvalidated device data.      No case tracking events are documented in the log.      Pain/Julissa Score: Pain Rating Prior to Med Admin: 5 (10/20/2022  4:08 PM)  Julissa Score: 10 (10/20/2022  4:15 PM)

## 2022-10-20 NOTE — CONSULTS
GASTROENTEROLOGY INPATIENT CONSULT NOTE  Patient Name: Noemi Huang  Patient MRN: [id number]  Patient : 1980    Admit Date: 10/20/2022  Service date: 10/20/2022    Reason for Consult: bile leak    PCP: LAURENT Alvarado    No chief complaint on file.      HPI: Patient is a 42 y.o. female with PMHx  csxn, recent lap danitza, GERD, HTN presents for evaluation of bile leak. Acute, onset, constant, progressive since recent lap danitza. Had repeat laparoscopy following GB resection w/ bile in portal region and MONA drain placed which is currently putting out bal colored fluid. No blood thinners, h/o gastric surgery or NSAID allergy    Past Medical History:  Past Medical History:   Diagnosis Date    Allergy     Asthma         Past Surgical History:  Past Surgical History:   Procedure Laterality Date     SECTION      DILATION AND CURETTAGE OF UTERUS  2022    KNEE SURGERY          Home Medications:  Medications Prior to Admission   Medication Sig Dispense Refill Last Dose    albuterol (PROVENTIL/VENTOLIN HFA) 90 mcg/actuation inhaler ProAir HFA 90 mcg/actuation aerosol inhaler   Inhale 2 puffs every 4 hours by inhalation route as needed.       busPIRone (BUSPAR) 7.5 MG tablet Take 1 tablet (7.5 mg total) by mouth 2 (two) times a day. (Patient not taking: Reported on 2022) 180 tablet 1     olmesartan (BENICAR) 20 MG tablet Take 1 tablet (20 mg total) by mouth once daily. 90 tablet 1     omeprazole (PRILOSEC) 20 MG capsule Take 1 capsule (20 mg total) by mouth once daily. 30 capsule 5     spironolactone (ALDACTONE) 100 MG tablet 1/2 po daily x 3d then 1 po daily.  Do not take if you are or may be pregnant (Patient taking differently: Take 100 mg by mouth once daily. 1/2 po daily x 3d then 1 po daily.  Do not take if you are or may be pregnant) 30 tablet 11     SYMBICORT 80-4.5 mcg/actuation HFAA INHALE 2 PUFFS BY MOTH TWO TIMES A DAY IN THE MORNING AND EVENING. RINSE MOUTH AND THROAT AFTER USE           Inpatient Medications:    sodium chloride 0.9%    Review of patient's allergies indicates:   Allergen Reactions    Penicillins Anaphylaxis       Social History:   Social History     Occupational History    Not on file   Tobacco Use    Smoking status: Former     Types: Cigarettes     Quit date: 2004     Years since quittin.2    Smokeless tobacco: Never   Substance and Sexual Activity    Alcohol use: Not Currently    Drug use: Never    Sexual activity: Yes     Partners: Female     Birth control/protection: Partner-Vasectomy       Family History:   Family History   Problem Relation Age of Onset    Diabetes Mother     Hypertension Mother     Norris's esophagus Mother     Hypertension Father     Hyperlipidemia Maternal Grandmother     Hyperlipidemia Paternal Grandmother     Hyperlipidemia Paternal Grandfather     Esophageal cancer Maternal Uncle        Review of Systems:  A 10 point review of systems was performed and was normal, except as mentioned in the HPI, including constitutional, HEENT, heme, lymph, cardiovascular, respiratory, gastrointestinal, genitourinary, neurologic, endocrine, psychiatric and musculoskeletal.      OBJECTIVE:    Physical Exam:  24 Hour Vital Sign Ranges: Temp:  [98.2 °F (36.8 °C)] 98.2 °F (36.8 °C)  Pulse:  [74] 74  Resp:  [18] 18  SpO2:  [97 %] 97 %  BP: (113)/(55) 113/55  Most recent vitals: BP (!) 113/55 (BP Location: Left arm, Patient Position: Lying)   Pulse 74   Temp 98.2 °F (36.8 °C) (Oral)   Resp 18   LMP 10/13/2022 Comment: had lap danitza procedure on 18th morning, went home na came back in the hospital at 1215 AM to go back in the hospital due to ABd pain  SpO2 97%   Breastfeeding No    GEN: well-developed, well-nourished, awake and alert, non-toxic appearing adult  HEENT: PERRL, sclera anicteric, oral mucosa pink and moist without lesion  NECK: trachea midline; Good ROM  CV: regular rate and rhythm, no murmurs or gallops  RESP: clear to auscultation  bilaterally, no wheezes, rhonci or rales  ABD: soft, non-tender, non-distended, normal bowel sounds; bandages; MONA drain w/ bilious drainage  EXT: no swelling or edema, 2+ pulses distally  SKIN: no rashes or jaundice  PSYCH: normal affect    Labs:   No results for input(s): WBC, MCV, PLT in the last 72 hours.    Invalid input(s): HGBAU  No results for input(s): NA, K, CL, CO2, BUN, GLU in the last 72 hours.    Invalid input(s): CREA  No results for input(s): ALB in the last 72 hours.    Invalid input(s): ALKP, SGOT, SGPT, TBIL, DBIL, TPRO  No results for input(s): PT, INR, PTT in the last 72 hours.      Radiology Review:  No orders to display         IMPRESSION / RECOMMENDATIONS:  42 y.o. female with PMHx  csxn, recent lap danitza, GERD, HTN presents for evaluation of bile leak. RIsks, benefits, alternatives discussed in detail regarding upcoming procedures and sedation and possible complications. Some of the more common endoscopic complications include but not limited to immediate or delayed perforation, bleeding, infections, pain, inadvertent injury to surrounding tissue / organs margie pancreatitis and possible need for surgical evaluation. All questions answered and will proceed with procedure as planned.     -ERCP w/ sphicnterotomy / sweep / stent today.     Thank you for this consult.    Markel HammnodSundia [employer] III  10/20/2022  2:44 PM

## 2022-12-29 ENCOUNTER — HOSPITAL ENCOUNTER (OUTPATIENT)
Dept: RADIOLOGY | Facility: HOSPITAL | Age: 42
Discharge: HOME | End: 2022-12-29
Attending: ANESTHESIOLOGY
Payer: COMMERCIAL

## 2022-12-29 ENCOUNTER — HOSPITAL ENCOUNTER (OUTPATIENT)
Dept: PREADMISSION TESTING | Facility: HOSPITAL | Age: 42
Discharge: HOME | End: 2022-12-29
Attending: INTERNAL MEDICINE
Payer: COMMERCIAL

## 2022-12-29 VITALS
DIASTOLIC BLOOD PRESSURE: 74 MMHG | WEIGHT: 266 LBS | BODY MASS INDEX: 41.75 KG/M2 | HEIGHT: 67 IN | HEART RATE: 73 BPM | RESPIRATION RATE: 18 BRPM | SYSTOLIC BLOOD PRESSURE: 126 MMHG | TEMPERATURE: 98 F | OXYGEN SATURATION: 99 %

## 2022-12-29 DIAGNOSIS — Z01.818 PRE-OP TESTING: ICD-10-CM

## 2022-12-29 PROCEDURE — 93005 ELECTROCARDIOGRAM TRACING: CPT | Performed by: SPECIALIST

## 2022-12-29 PROCEDURE — 93010 EKG 12-LEAD: ICD-10-PCS | Mod: ,,, | Performed by: SPECIALIST

## 2022-12-29 PROCEDURE — 93010 ELECTROCARDIOGRAM REPORT: CPT | Mod: ,,, | Performed by: SPECIALIST

## 2022-12-29 PROCEDURE — 71046 X-RAY EXAM CHEST 2 VIEWS: CPT | Mod: TC

## 2022-12-29 NOTE — DISCHARGE INSTRUCTIONS
To confirm, Your procedure is scheduled for:  Tuesday, January 3, 2023    Endoscopy will call the afternoon prior with the final arrival time.   Monday, January 2, 2023     Please report to Outpatient [place] via [facility] entrance. Check in at registration desk.      TAKE ONLY THESE MEDICATIONS WITH A SMALL SIP OF WATER THE MORNING OF YOUR PROCEDURE:  INHALERS OK      DO NOT TAKE THESE MEDICATIONS PRIOR to your procedure or per your surgeon's request: ASPIRIN, ALEVE, ADVIL, IBUPROFEN, FISH OIL VITAMIN E, HERBALS  (May take Tylenol)       INSTRUCTIONS IMPORTANT!!    Please leave all jewelry, piercing's and valuables at home.     Make arrangements in advance for transportation home by a responsible adult.    You must make arrangements for transportation, TAXI'S, UBER'S OR LYFTS ARE NOT ALLOWED.        If you have any questions about these instructions, call Pre-Op Admit  Nursing at [phone] or the Pre-Op Endoscopy [phone]

## 2023-01-23 ENCOUNTER — OFFICE VISIT (OUTPATIENT)
Dept: FAMILY MEDICINE | Facility: CLINIC | Age: 43
End: 2023-01-23
Payer: COMMERCIAL

## 2023-01-23 VITALS
BODY MASS INDEX: 42.51 KG/M2 | HEART RATE: 69 BPM | WEIGHT: 270.81 LBS | TEMPERATURE: 98 F | RESPIRATION RATE: 20 BRPM | DIASTOLIC BLOOD PRESSURE: 67 MMHG | HEIGHT: 67 IN | OXYGEN SATURATION: 100 % | SYSTOLIC BLOOD PRESSURE: 110 MMHG

## 2023-01-23 DIAGNOSIS — I10 ESSENTIAL HYPERTENSION: ICD-10-CM

## 2023-01-23 DIAGNOSIS — D64.9 ANEMIA, UNSPECIFIED TYPE: ICD-10-CM

## 2023-01-23 DIAGNOSIS — Z13.1 DIABETES MELLITUS SCREENING: ICD-10-CM

## 2023-01-23 DIAGNOSIS — Z23 NEED FOR INFLUENZA VACCINATION: ICD-10-CM

## 2023-01-23 DIAGNOSIS — Z12.31 ENCOUNTER FOR SCREENING MAMMOGRAM FOR MALIGNANT NEOPLASM OF BREAST: ICD-10-CM

## 2023-01-23 DIAGNOSIS — Z00.01 ENCOUNTER FOR ROUTINE ADULT HEALTH EXAMINATION WITH ABNORMAL FINDINGS: Primary | ICD-10-CM

## 2023-01-23 PROCEDURE — 90471 IMMUNIZATION ADMIN: CPT | Mod: S$GLB,,, | Performed by: NURSE PRACTITIONER

## 2023-01-23 PROCEDURE — 1159F PR MEDICATION LIST DOCUMENTED IN MEDICAL RECORD: ICD-10-PCS | Mod: CPTII,S$GLB,, | Performed by: NURSE PRACTITIONER

## 2023-01-23 PROCEDURE — 90686 IIV4 VACC NO PRSV 0.5 ML IM: CPT | Mod: S$GLB,,, | Performed by: NURSE PRACTITIONER

## 2023-01-23 PROCEDURE — 90686 FLU VACCINE (QUAD) GREATER THAN OR EQUAL TO 3YO PRESERVATIVE FREE IM: ICD-10-PCS | Mod: S$GLB,,, | Performed by: NURSE PRACTITIONER

## 2023-01-23 PROCEDURE — 99396 PREV VISIT EST AGE 40-64: CPT | Mod: 25,S$GLB,, | Performed by: NURSE PRACTITIONER

## 2023-01-23 PROCEDURE — 3078F PR MOST RECENT DIASTOLIC BLOOD PRESSURE < 80 MM HG: ICD-10-PCS | Mod: CPTII,S$GLB,, | Performed by: NURSE PRACTITIONER

## 2023-01-23 PROCEDURE — 4010F ACE/ARB THERAPY RXD/TAKEN: CPT | Mod: CPTII,S$GLB,, | Performed by: NURSE PRACTITIONER

## 2023-01-23 PROCEDURE — 99396 PR PREVENTIVE VISIT,EST,40-64: ICD-10-PCS | Mod: 25,S$GLB,, | Performed by: NURSE PRACTITIONER

## 2023-01-23 PROCEDURE — 3008F BODY MASS INDEX DOCD: CPT | Mod: CPTII,S$GLB,, | Performed by: NURSE PRACTITIONER

## 2023-01-23 PROCEDURE — 90471 FLU VACCINE (QUAD) GREATER THAN OR EQUAL TO 3YO PRESERVATIVE FREE IM: ICD-10-PCS | Mod: S$GLB,,, | Performed by: NURSE PRACTITIONER

## 2023-01-23 PROCEDURE — 3074F SYST BP LT 130 MM HG: CPT | Mod: CPTII,S$GLB,, | Performed by: NURSE PRACTITIONER

## 2023-01-23 PROCEDURE — 1160F RVW MEDS BY RX/DR IN RCRD: CPT | Mod: CPTII,S$GLB,, | Performed by: NURSE PRACTITIONER

## 2023-01-23 PROCEDURE — 3078F DIAST BP <80 MM HG: CPT | Mod: CPTII,S$GLB,, | Performed by: NURSE PRACTITIONER

## 2023-01-23 PROCEDURE — 3008F PR BODY MASS INDEX (BMI) DOCUMENTED: ICD-10-PCS | Mod: CPTII,S$GLB,, | Performed by: NURSE PRACTITIONER

## 2023-01-23 PROCEDURE — 1160F PR REVIEW ALL MEDS BY PRESCRIBER/CLIN PHARMACIST DOCUMENTED: ICD-10-PCS | Mod: CPTII,S$GLB,, | Performed by: NURSE PRACTITIONER

## 2023-01-23 PROCEDURE — 1159F MED LIST DOCD IN RCRD: CPT | Mod: CPTII,S$GLB,, | Performed by: NURSE PRACTITIONER

## 2023-01-23 PROCEDURE — 4010F PR ACE/ARB THEARPY RXD/TAKEN: ICD-10-PCS | Mod: CPTII,S$GLB,, | Performed by: NURSE PRACTITIONER

## 2023-01-23 PROCEDURE — 3074F PR MOST RECENT SYSTOLIC BLOOD PRESSURE < 130 MM HG: ICD-10-PCS | Mod: CPTII,S$GLB,, | Performed by: NURSE PRACTITIONER

## 2023-01-23 RX ORDER — OLMESARTAN MEDOXOMIL 20 MG/1
20 TABLET ORAL DAILY
Qty: 90 TABLET | Refills: 1 | Status: SHIPPED | OUTPATIENT
Start: 2023-01-23 | End: 2023-07-24 | Stop reason: SDUPTHER

## 2023-01-23 NOTE — PROGRESS NOTES
SUBJECTIVE:   HPI: Noemi Huang  is a 42 y.o. female who presents for annual physical .   Annual Exam    Noemi is here for annual exam and physical today.  She is taking all of her medications as prescribed daily without side effects or complaints.  Blood pressure is below goal and she is feeling well.  She did have her gallbladder removed since last visit with me-had some postop complications and required a stent-scheduled with GI tomorrow to have it removed.  Patient is due for a seasonal flu vaccine which will be given today.  Upcoming mammogram is due as well.  Yearly lab work will be ordered.  Patient is having some heavy menses due to fibroids and is complaining of hair loss, dizziness, and feeling tired.  Recent CBC in December reviewed-discuss could be iron-deficiency anemia causing symptoms.     (Not in a hospital admission)    Review of patient's allergies indicates:   Allergen Reactions    Penicillins Anaphylaxis     Current Outpatient Medications on File Prior to Visit   Medication Sig Dispense Refill    albuterol (PROVENTIL/VENTOLIN HFA) 90 mcg/actuation inhaler Inhale 2 puffs into the lungs every 4 (four) hours as needed.      busPIRone (BUSPAR) 7.5 MG tablet Take 1 tablet (7.5 mg total) by mouth 2 (two) times a day. (Patient taking differently: Take 7.5 mg by mouth 2 (two) times daily as needed.) 180 tablet 1    omeprazole (PRILOSEC) 20 MG capsule Take 1 capsule (20 mg total) by mouth once daily. 30 capsule 5    SYMBICORT 80-4.5 mcg/actuation HFAA Inhale 2 puffs into the lungs Daily.      [DISCONTINUED] olmesartan (BENICAR) 20 MG tablet Take 1 tablet (20 mg total) by mouth once daily. 90 tablet 1     No current facility-administered medications on file prior to visit.     Past Medical History:   Diagnosis Date    Allergy     Asthma     Chronic GERD     Hypertension      Past Surgical History:   Procedure Laterality Date     SECTION      [id number]-(twins)    CHOLECYSTECTOMY   10/18/2022    DILATION AND CURETTAGE OF UTERUS  2022    ERCP N/A 10/20/2022    Procedure: ERCP (ENDOSCOPIC RETROGRADE CHOLANGIOPANCREATOGRAPHY);  Surgeon: Markel Gilmore III, MD;  Location: [facility];  Service: Endoscopy;  Laterality: N/A;    KNEE SURGERY Left 2002    meniscus repair     Family History   Problem Relation Age of Onset    Diabetes Mother     Hypertension Mother     Norris's esophagus Mother     Hypertension Father     Hyperlipidemia Maternal Grandmother     Hyperlipidemia Paternal Grandmother     Hyperlipidemia Paternal Grandfather     Esophageal cancer Maternal Uncle      Social History     Tobacco Use    Smoking status: Former     Years: 3.00     Types: Cigarettes     Quit date: 2004     Years since quittin.5    Smokeless tobacco: Never   Substance Use Topics    Alcohol use: Not Currently    Drug use: Never      Health Maintenance Topics with due status: Not Due       Topic Last Completion Date    TETANUS VACCINE 2021    Cervical Cancer Screening 10/10/2022     Immunization History   Administered Date(s) Administered    COVID-19 Vaccine 2021, 2021    COVID-19, MRNA, LN-S, PF (Pfizer) (Purple Cap) 2021, 2021, 2021    Influenza - Quadrivalent - PF *Preferred* (6 months and older) 2018, 2018, 2021    Tdap 2021       Review of Systems   Constitutional:  Positive for fatigue. Negative for activity change, appetite change, chills, diaphoresis, fever and unexpected weight change.   HENT:  Negative for congestion, hearing loss, rhinorrhea, sore throat and trouble swallowing.    Eyes:  Negative for discharge and visual disturbance.   Respiratory:  Negative for cough, choking, chest tightness, shortness of breath and wheezing.    Cardiovascular:  Negative for chest pain, palpitations and leg swelling.   Gastrointestinal:  Negative for abdominal pain, blood in stool, constipation, diarrhea, nausea and vomiting.        Gerd  "  Endocrine: Negative for polydipsia and polyuria.        Hair loss   Genitourinary:  Positive for menstrual problem (heavy menses). Negative for difficulty urinating, dysuria, frequency, hematuria, pelvic pain, vaginal discharge and vaginal pain.   Musculoskeletal:  Negative for arthralgias, joint swelling and neck pain.   Skin:  Negative for pallor.   Neurological:  Positive for dizziness. Negative for weakness, numbness and headaches.   Hematological:  Negative for adenopathy. Does not bruise/bleed easily.   Psychiatric/Behavioral:  Negative for confusion, decreased concentration, dysphoric mood, sleep disturbance and suicidal ideas. The patient is not nervous/anxious.     OBJECTIVE:      Vitals:    01/23/23 0857   BP: 110/67   BP Location: Right arm   Patient Position: Sitting   BP Method: Large (Automatic)   Pulse: 69   Resp: 20   Temp: 97.9 °F (36.6 °C)   SpO2: 100%   Weight: 122.8 kg (270 lb 12.8 oz)   Height: 5' 7" (1.702 m)     Physical Exam  Vitals and nursing note reviewed.   Constitutional:       General: She is not in acute distress.     Appearance: Normal appearance. She is well-developed. She is obese. She is not ill-appearing or diaphoretic.   HENT:      Head: Normocephalic.      Right Ear: Tympanic membrane, ear canal and external ear normal.      Left Ear: Tympanic membrane, ear canal and external ear normal.      Nose: Nose normal.      Mouth/Throat:      Mouth: Mucous membranes are moist.      Pharynx: Oropharynx is clear. No oropharyngeal exudate.   Eyes:      General: No scleral icterus.     Pupils: Pupils are equal, round, and reactive to light.   Neck:      Thyroid: No thyroid mass or thyromegaly.   Cardiovascular:      Rate and Rhythm: Normal rate and regular rhythm.      Heart sounds: Normal heart sounds. No murmur heard.  Pulmonary:      Effort: Pulmonary effort is normal. No respiratory distress.      Breath sounds: Normal breath sounds. No wheezing, rhonchi or rales.   Abdominal:      " General: Bowel sounds are normal.      Palpations: Abdomen is soft.      Tenderness: There is no abdominal tenderness.   Genitourinary:     Comments:    Musculoskeletal:         General: Normal range of motion.      Cervical back: Normal range of motion and neck supple.      Right lower leg: No edema.      Left lower leg: No edema.   Lymphadenopathy:      Cervical: No cervical adenopathy.   Skin:     General: Skin is warm and dry.      Coloration: Skin is not jaundiced or pale.      Findings: No bruising.   Neurological:      Mental Status: She is alert and oriented to person, place, and time.   Psychiatric:         Mood and Affect: Mood normal.         Behavior: Behavior normal.         Thought Content: Thought content normal.         Judgment: Judgment normal.      Assessment:       1. Encounter for routine adult health examination with abnormal findings    2. Essential hypertension    3. Anemia, unspecified type    4. Diabetes mellitus screening    5. Encounter for screening mammogram for malignant neoplasm of breast    6. BMI 40.0-44.9, adult    7. Need for influenza vaccination        Plan:       Encounter for routine adult health examination with abnormal findings    Essential hypertension  -     CBC Auto Differential; Future; Expected date: 01/23/2023  -     Comprehensive Metabolic Panel; Future; Expected date: 01/23/2023  -     Lipid Panel; Future; Expected date: 01/23/2023  -     TSH; Future; Expected date: 01/23/2023  -     Urinalysis; Future; Expected date: 01/23/2023  -     olmesartan (BENICAR) 20 MG tablet; Take 1 tablet (20 mg total) by mouth once daily.  Dispense: 90 tablet; Refill: 1    Anemia, unspecified type  -     Ferritin; Future; Expected date: 01/23/2023  -     Iron and TIBC; Future; Expected date: 01/23/2023    Diabetes mellitus screening  -     Comprehensive Metabolic Panel; Future; Expected date: 01/23/2023  -     Hemoglobin A1C; Future; Expected date: 01/23/2023    Encounter for screening  mammogram for malignant neoplasm of breast  -     Mammo Digital Screening Bilat w/ Thang; Future; Expected date: 01/23/2023    BMI 40.0-44.9, adult    Need for influenza vaccination  -     Influenza - Quadrivalent *Preferred* (6 months+) (PF)        Counseled on age and gender appropriate medical preventative services, including cancer screenings, immunizations, overall nutritional health, need for a consistent exercise regimen and an overall push towards maintaining a vigorous and active lifestyle.      Follow up in about 6 months (around 7/23/2023) for f/u HTN .        This note was created using [employer] voice recognition software that occasionally misinterprets phrases or words.

## 2023-01-24 ENCOUNTER — ANESTHESIA EVENT (OUTPATIENT)
Dept: SURGERY | Facility: HOSPITAL | Age: 43
End: 2023-01-24
Payer: COMMERCIAL

## 2023-01-24 ENCOUNTER — HOSPITAL ENCOUNTER (OUTPATIENT)
Facility: HOSPITAL | Age: 43
Discharge: HOME | End: 2023-01-24
Attending: INTERNAL MEDICINE | Admitting: INTERNAL MEDICINE
Payer: COMMERCIAL

## 2023-01-24 ENCOUNTER — ANESTHESIA (OUTPATIENT)
Dept: SURGERY | Facility: HOSPITAL | Age: 43
End: 2023-01-24
Payer: COMMERCIAL

## 2023-01-24 VITALS
OXYGEN SATURATION: 100 % | TEMPERATURE: 98 F | RESPIRATION RATE: 19 BRPM | HEART RATE: 65 BPM | DIASTOLIC BLOOD PRESSURE: 94 MMHG | SYSTOLIC BLOOD PRESSURE: 143 MMHG

## 2023-01-24 DIAGNOSIS — Z01.818 PRE-OP TESTING: ICD-10-CM

## 2023-01-24 DIAGNOSIS — R10.9 ABDOMINAL PAIN: ICD-10-CM

## 2023-01-24 LAB
B-HCG UR QL: NEGATIVE
CTP QC/QA: YES

## 2023-01-24 PROCEDURE — 37000008 HC ANESTHESIA 1ST 15 MINUTES: Performed by: INTERNAL MEDICINE

## 2023-01-24 PROCEDURE — 43264 ERCP REMOVE DUCT CALCULI: CPT | Performed by: INTERNAL MEDICINE

## 2023-01-24 PROCEDURE — 81025 URINE PREGNANCY TEST: CPT | Performed by: ANESTHESIOLOGY

## 2023-01-24 PROCEDURE — C1769 GUIDE WIRE: HCPCS | Performed by: INTERNAL MEDICINE

## 2023-01-24 PROCEDURE — 25000003 PHARM REV CODE 250: Performed by: NURSE ANESTHETIST, CERTIFIED REGISTERED

## 2023-01-24 PROCEDURE — 27201089 HC SNARE, DISP (ANY): Performed by: INTERNAL MEDICINE

## 2023-01-24 PROCEDURE — 37000009 HC ANESTHESIA EA ADD 15 MINS: Performed by: INTERNAL MEDICINE

## 2023-01-24 PROCEDURE — 27202125 HC BALLOON, EXTRACTION (ANY): Performed by: INTERNAL MEDICINE

## 2023-01-24 PROCEDURE — D9220A PRA ANESTHESIA: ICD-10-PCS | Mod: ,,, | Performed by: STUDENT IN AN ORGANIZED HEALTH CARE EDUCATION/TRAINING PROGRAM

## 2023-01-24 PROCEDURE — D9220A PRA ANESTHESIA: Mod: ,,, | Performed by: STUDENT IN AN ORGANIZED HEALTH CARE EDUCATION/TRAINING PROGRAM

## 2023-01-24 PROCEDURE — 63600175 PHARM REV CODE 636 W HCPCS: Performed by: NURSE ANESTHETIST, CERTIFIED REGISTERED

## 2023-01-24 PROCEDURE — 25000003 PHARM REV CODE 250: Performed by: INTERNAL MEDICINE

## 2023-01-24 PROCEDURE — 43275 ERCP REMOVE FORGN BODY DUCT: CPT | Performed by: INTERNAL MEDICINE

## 2023-01-24 RX ORDER — FAMOTIDINE 10 MG/ML
INJECTION INTRAVENOUS
Status: DISCONTINUED | OUTPATIENT
Start: 2023-01-24 | End: 2023-01-25

## 2023-01-24 RX ORDER — MIDAZOLAM HYDROCHLORIDE 1 MG/ML
INJECTION INTRAMUSCULAR; INTRAVENOUS
Status: DISCONTINUED | OUTPATIENT
Start: 2023-01-24 | End: 2023-01-25

## 2023-01-24 RX ORDER — LIDOCAINE HYDROCHLORIDE 20 MG/ML
INJECTION, SOLUTION EPIDURAL; INFILTRATION; INTRACAUDAL; PERINEURAL
Status: DISCONTINUED | OUTPATIENT
Start: 2023-01-24 | End: 2023-01-25

## 2023-01-24 RX ORDER — ONDANSETRON HYDROCHLORIDE 2 MG/ML
INJECTION, SOLUTION INTRAMUSCULAR; INTRAVENOUS
Status: DISCONTINUED | OUTPATIENT
Start: 2023-01-24 | End: 2023-01-25

## 2023-01-24 RX ORDER — DEXAMETHASONE SODIUM PHOSPHATE 4 MG/ML
INJECTION, SOLUTION INTRA-ARTICULAR; INTRALESIONAL; INTRAMUSCULAR; INTRAVENOUS; SOFT TISSUE
Status: DISCONTINUED | OUTPATIENT
Start: 2023-01-24 | End: 2023-01-25

## 2023-01-24 RX ORDER — SUCCINYLCHOLINE CHLORIDE 20 MG/ML
INJECTION INTRAMUSCULAR; INTRAVENOUS
Status: DISCONTINUED | OUTPATIENT
Start: 2023-01-24 | End: 2023-01-25

## 2023-01-24 RX ORDER — ROCURONIUM BROMIDE 10 MG/ML
INJECTION, SOLUTION INTRAVENOUS
Status: DISCONTINUED | OUTPATIENT
Start: 2023-01-24 | End: 2023-01-25

## 2023-01-24 RX ORDER — PROPOFOL 10 MG/ML
VIAL (ML) INTRAVENOUS
Status: DISCONTINUED | OUTPATIENT
Start: 2023-01-24 | End: 2023-01-25

## 2023-01-24 RX ORDER — INDOMETHACIN 50 MG/1
SUPPOSITORY RECTAL
Status: DISCONTINUED | OUTPATIENT
Start: 2023-01-24 | End: 2023-01-24 | Stop reason: HOSPADM

## 2023-01-24 RX ADMIN — DEXAMETHASONE SODIUM PHOSPHATE 4 MG: 4 INJECTION, SOLUTION INTRAMUSCULAR; INTRAVENOUS at 04:01

## 2023-01-24 RX ADMIN — ROCURONIUM BROMIDE 5 MG: 10 INJECTION, SOLUTION INTRAVENOUS at 04:01

## 2023-01-24 RX ADMIN — LIDOCAINE HYDROCHLORIDE 60 MG: 20 INJECTION, SOLUTION EPIDURAL; INFILTRATION; INTRACAUDAL; PERINEURAL at 04:01

## 2023-01-24 RX ADMIN — PROPOFOL 170 MG: 10 INJECTION, EMULSION INTRAVENOUS at 04:01

## 2023-01-24 RX ADMIN — ONDANSETRON 4 MG: 2 INJECTION INTRAMUSCULAR; INTRAVENOUS at 04:01

## 2023-01-24 RX ADMIN — SODIUM CHLORIDE, SODIUM LACTATE, POTASSIUM CHLORIDE, AND CALCIUM CHLORIDE: .6; .31; .03; .02 INJECTION, SOLUTION INTRAVENOUS at 04:01

## 2023-01-24 RX ADMIN — SUGAMMADEX 100 MG: 100 INJECTION, SOLUTION INTRAVENOUS at 04:01

## 2023-01-24 RX ADMIN — ROCURONIUM BROMIDE 30 MG: 10 INJECTION, SOLUTION INTRAVENOUS at 04:01

## 2023-01-24 RX ADMIN — MIDAZOLAM HYDROCHLORIDE 2 MG: 1 INJECTION, SOLUTION INTRAMUSCULAR; INTRAVENOUS at 04:01

## 2023-01-24 RX ADMIN — FAMOTIDINE 20 MG: 10 INJECTION, SOLUTION INTRAVENOUS at 04:01

## 2023-01-24 RX ADMIN — Medication 120 MG: at 04:01

## 2023-01-24 NOTE — ANESTHESIA PROCEDURE NOTES
Intubation    Date/Time: 1/24/2023 4:34 PM  Performed by: Luis Fernando Miller CRNA  Authorized by: Trell Topete MD     Intubation:     Induction:  Intravenous    Intubated:  Postinduction    Mask Ventilation:  Easy mask    Attempts:  1    Attempted By:  CRNA    Method of Intubation:  Video laryngoscopy    Blade:  Real 3    Laryngeal View Grade: Grade I - full view of cords      Difficult Airway Encountered?: No      Complications:  None    Airway Device:  Oral endotracheal tube    Airway Device Size:  7.0    Style/Cuff Inflation:  Cuffed    Inflation Amount (mL):  5    Tube secured:  20    Placement Verified By:  Capnometry    Complicating Factors:  None    Findings Post-Intubation:  BS equal bilateral

## 2023-01-24 NOTE — ANESTHESIA PREPROCEDURE EVALUATION
01/24/2023  Noemi Huang is a 42 y.o., female.      Pre-op Assessment    I have reviewed the Patient Summary Reports.     I have reviewed the Nursing Notes. I have reviewed the NPO Status.   I have reviewed the Medications.     Review of Systems  Anesthesia Hx:  No problems with previous Anesthesia  Neg history of prior surgery. Denies Family Hx of Anesthesia complications.   Denies Personal Hx of Anesthesia complications.   Social:  Former Smoker, No Alcohol Use    Hematology/Oncology:  Hematology Normal   Oncology Normal     EENT/Dental:EENT/Dental Normal   Cardiovascular:   Hypertension ECG has been reviewed.    Pulmonary:   Asthma mild    Renal/:  Renal/ Normal     Hepatic/GI:   GERD    Musculoskeletal:  Musculoskeletal Normal    Neurological:  Neurology Normal    Endocrine:  Endocrine Normal    Dermatological:  Skin Normal    Psych:  Psychiatric Normal           Physical Exam  General: Well nourished, Alert, Cooperative and Oriented    Airway:  Mallampati: II   Mouth Opening: Normal  TM Distance: Normal  Tongue: Normal  Neck ROM: Normal ROM    Dental:  Intact    Chest/Lungs:  Clear to auscultation, Normal Respiratory Rate    Heart:  Rate: Normal  Rhythm: Regular Rhythm  Sounds: Normal        Anesthesia Plan  Type of Anesthesia, risks & benefits discussed:    Anesthesia Type: Gen ETT  Intra-op Monitoring Plan: Standard ASA Monitors  Post Op Pain Control Plan: multimodal analgesia  Induction:  IV  Airway Plan: Video, Post-Induction  Informed Consent: Informed consent signed with the Patient and all parties understand the risks and agree with anesthesia plan.  All questions answered. Patient consented to blood products? Yes  ASA Score: 2    Ready For Surgery From Anesthesia Perspective.     .

## 2023-01-24 NOTE — [employer] PATIENT INSTRUCTIONS
Discharge Summary/Instructions after an Endoscopic Procedure  Patient Name: Noemi Huang  Patient MRN: [id number]  Patient YOB: 1980 Tuesday, January 24, 2023  Markel Gilmore III, MD  RESTRICTIONS:  During your procedure today, you received medications for sedation.  These   medications may affect your judgment, balance and coordination.  Therefore,   for 24 hours, you have the following restrictions:   - DO NOT drive a car, operate machinery, make legal/financial decisions,   sign important papers or drink alcohol.    ACTIVITY:  Today: no heavy lifting, straining or running due to procedural   sedation/anesthesia.  The following day: return to full activity including work.  DIET:  Eat and drink normally unless instructed otherwise.     TREATMENT FOR COMMON SIDE EFFECTS:  - Mild abdominal pain, nausea, belching, bloating or excessive gas:  rest,   eat lightly and use a heating pad.  - Sore Throat: treat with throat lozenges and/or gargle with warm salt   water.  - Because air was used during the procedure, expelling large amounts of air   from your rectum or belching is normal.  - If a bowel prep was taken, you may not have a bowel movement for 1-3 days.    This is normal.  SYMPTOMS TO WATCH FOR AND REPORT TO YOUR PHYSICIAN:  1. Abdominal pain or bloating, other than gas cramps.  2. Chest pain.  3. Back pain.  4. Signs of infection such as: chills or fever occurring within 24 hours   after the procedure.  5. Rectal bleeding, which would show as bright red, maroon, or black stools.   (A tablespoon of blood from the rectum is not serious, especially if   hemorrhoids are present.)  6. Vomiting.  7. Weakness or dizziness.  GO DIRECTLY TO THE NEAREST EMERGENCY ROOM IF YOU HAVE ANY OF THE FOLLOWING:      Difficulty breathing              Chills and/or fever over 101 F   Persistent vomiting and/or vomiting blood   Severe abdominal pain   Severe chest pain   Black, tarry stools   Bleeding- more than one  tablespoon   Any other symptom or condition that you feel may need urgent attention  Your doctor recommends these additional instructions:  If any biopsies were taken, your doctors clinic will contact you in 1 to 2   weeks with any results.  - Advance diet as tolerated.   - Patient has a contact number available for emergencies.  The signs and   symptoms of potential delayed complications were discussed with the   patient.  Return to normal activities tomorrow.  Written discharge   instructions were provided to the patient.   - Discharge patient to home (with escort).  For questions, problems or results please call your physician - Markel Gilmore III, MD at Work:  [phone].  [facility], EMERGENCY ROOM PHONE NUMBER: [phone]  IF A COMPLICATION OR EMERGENCY SITUATION ARISES AND YOU ARE UNABLE TO REACH   YOUR PHYSICIAN - GO DIRECTLY TO THE EMERGENCY ROOM.  Markel Gilmore III, MD  1/24/2023 5:01:05 PM  This report has been verified and signed electronically.  Dear patient,  As a result of recent federal legislation (The Federal Cures Act), you may   receive lab or pathology results from your procedure in your [facility]   account before your physician is able to contact you. Your physician or   their representative will relay the results to you with their   recommendations at their soonest availability.  Thank you,  [employer]

## 2023-01-25 NOTE — ANESTHESIA POSTPROCEDURE EVALUATION
Anesthesia Post Evaluation    Patient: Noemi Huang    Procedure(s) Performed: Procedure(s) (LRB):  ERCP (ENDOSCOPIC RETROGRADE CHOLANGIOPANCREATOGRAPHY) (N/A)    Final Anesthesia Type: general      Patient location during evaluation: PACU  Patient participation: Yes- Able to Participate  Level of consciousness: awake and alert  Post-procedure vital signs: reviewed and stable  Pain management: adequate  Airway patency: patent    PONV status at discharge: No PONV  Anesthetic complications: no      Cardiovascular status: hemodynamically stable  Respiratory status: unassisted, spontaneous ventilation and room air  Hydration status: euvolemic  Follow-up not needed.          Vitals Value Taken Time   /94 01/24/23 1730   Temp 36.8 °C (98.2 °F) 01/24/23 1702   Pulse 72 01/24/23 1732   Resp 25 01/24/23 1732   SpO2 100 % 01/24/23 1732   Vitals shown include unvalidated device data.      Event Time   Out of Recovery 17:36:09         Pain/Julissa Score: Julissa Score: 10 (1/24/2023  5:30 PM)

## 2023-01-27 LAB
ALBUMIN SERPL-MCNC: 3.8 G/DL (ref 3.6–5.1)
ALBUMIN/GLOB SERPL: 1.4 (CALC) (ref 1–2.5)
ALP SERPL-CCNC: 82 U/L (ref 31–125)
ALT SERPL-CCNC: 11 U/L (ref 6–29)
APPEARANCE UR: CLEAR
AST SERPL-CCNC: 13 U/L (ref 10–30)
BASOPHILS # BLD AUTO: 60 CELLS/UL (ref 0–200)
BASOPHILS NFR BLD AUTO: 0.7 %
BILIRUB SERPL-MCNC: 0.4 MG/DL (ref 0.2–1.2)
BILIRUB UR QL STRIP: NEGATIVE
BUN SERPL-MCNC: 17 MG/DL (ref 7–25)
BUN/CREAT SERPL: NORMAL (CALC) (ref 6–22)
CALCIUM SERPL-MCNC: 8.9 MG/DL (ref 8.6–10.2)
CHLORIDE SERPL-SCNC: 106 MMOL/L (ref 98–110)
CHOLEST SERPL-MCNC: 155 MG/DL
CHOLEST/HDLC SERPL: 3.8 (CALC)
CO2 SERPL-SCNC: 24 MMOL/L (ref 20–32)
COLOR UR: YELLOW
CREAT SERPL-MCNC: 0.76 MG/DL (ref 0.5–0.99)
EGFR: 101 ML/MIN/1.73M2
EOSINOPHIL # BLD AUTO: 129 CELLS/UL (ref 15–500)
EOSINOPHIL NFR BLD AUTO: 1.5 %
ERYTHROCYTE [DISTWIDTH] IN BLOOD BY AUTOMATED COUNT: 17 % (ref 11–15)
FERRITIN SERPL-MCNC: 8 NG/ML (ref 16–232)
GLOBULIN SER CALC-MCNC: 2.7 G/DL (CALC) (ref 1.9–3.7)
GLUCOSE SERPL-MCNC: 82 MG/DL (ref 65–99)
GLUCOSE UR QL STRIP: NEGATIVE
HBA1C MFR BLD: 5 % OF TOTAL HGB
HCT VFR BLD AUTO: 32.4 % (ref 35–45)
HDLC SERPL-MCNC: 41 MG/DL
HGB BLD-MCNC: 10.2 G/DL (ref 11.7–15.5)
HGB UR QL STRIP: NEGATIVE
IRON SATN MFR SERPL: 7 % (CALC) (ref 16–45)
IRON SERPL-MCNC: 26 MCG/DL (ref 40–190)
KETONES UR QL STRIP: NEGATIVE
LDLC SERPL CALC-MCNC: 94 MG/DL (CALC)
LEUKOCYTE ESTERASE UR QL STRIP: NEGATIVE
LYMPHOCYTES # BLD AUTO: 2528 CELLS/UL (ref 850–3900)
LYMPHOCYTES NFR BLD AUTO: 29.4 %
MCH RBC QN AUTO: 23.9 PG (ref 27–33)
MCHC RBC AUTO-ENTMCNC: 31.5 G/DL (ref 32–36)
MCV RBC AUTO: 76.1 FL (ref 80–100)
MONOCYTES # BLD AUTO: 593 CELLS/UL (ref 200–950)
MONOCYTES NFR BLD AUTO: 6.9 %
NEUTROPHILS # BLD AUTO: 5289 CELLS/UL (ref 1500–7800)
NEUTROPHILS NFR BLD AUTO: 61.5 %
NITRITE UR QL STRIP: NEGATIVE
NONHDLC SERPL-MCNC: 114 MG/DL (CALC)
PH UR STRIP: 6 [PH] (ref 5–8)
PLATELET # BLD AUTO: 324 THOUSAND/UL (ref 140–400)
PMV BLD REES-ECKER: 10.2 FL (ref 7.5–12.5)
POTASSIUM SERPL-SCNC: 4.2 MMOL/L (ref 3.5–5.3)
PROT SERPL-MCNC: 6.5 G/DL (ref 6.1–8.1)
PROT UR QL STRIP: NEGATIVE
RBC # BLD AUTO: 4.26 MILLION/UL (ref 3.8–5.1)
SODIUM SERPL-SCNC: 139 MMOL/L (ref 135–146)
SP GR UR STRIP: 1.02 (ref 1–1.03)
TIBC SERPL-MCNC: 370 MCG/DL (CALC) (ref 250–450)
TRIGL SERPL-MCNC: 104 MG/DL
TSH SERPL-ACNC: 1.83 MIU/L
WBC # BLD AUTO: 8.6 THOUSAND/UL (ref 3.8–10.8)

## 2023-01-30 ENCOUNTER — PATIENT MESSAGE (OUTPATIENT)
Dept: FAMILY MEDICINE | Facility: CLINIC | Age: 43
End: 2023-01-30

## 2023-01-30 DIAGNOSIS — D50.9 IRON DEFICIENCY ANEMIA, UNSPECIFIED IRON DEFICIENCY ANEMIA TYPE: Primary | ICD-10-CM

## 2023-01-30 RX ORDER — FERROUS SULFATE 325(65) MG
325 TABLET ORAL
Qty: 30 TABLET | Refills: 2 | Status: SHIPPED | OUTPATIENT
Start: 2023-01-30 | End: 2023-09-11

## 2023-01-31 NOTE — H&P
GASTROENTEROLOGY PRE-PROCEDURE H&P NOTE  Patient Name: Noemi Huang  Patient MRN: [id number]  Patient : 1980    Service date: 23    PCP: LAURENT Alvarado    No chief complaint on file.      HPI: Patient is a 42 y.o. female with PMHx as below here for evaluation of f/u bile leak.     Past Medical History:  Past Medical History:   Diagnosis Date    Allergy     Asthma     Chronic GERD     Hypertension         Past Surgical History:  Past Surgical History:   Procedure Laterality Date     SECTION      [id number]-(twins)    CHOLECYSTECTOMY  10/18/2022    DILATION AND CURETTAGE OF UTERUS  2022    ERCP N/A 10/20/2022    Procedure: ERCP (ENDOSCOPIC RETROGRADE CHOLANGIOPANCREATOGRAPHY);  Surgeon: Markel Gilmore III, MD;  Location: [facility];  Service: Endoscopy;  Laterality: N/A;    ERCP N/A 2023    Procedure: ERCP (ENDOSCOPIC RETROGRADE CHOLANGIOPANCREATOGRAPHY);  Surgeon: Markel Gilmore III, MD;  Location: [facility];  Service: Endoscopy;  Laterality: N/A;    KNEE SURGERY Left     meniscus repair        Home Medications:  No medications prior to admission.       Inpatient Medications:        Review of patient's allergies indicates:   Allergen Reactions    Penicillins Anaphylaxis       Social History:   Social History     Occupational History    Not on file   Tobacco Use    Smoking status: Former     Years: 3.00     Types: Cigarettes     Quit date: 2004     Years since quittin.5    Smokeless tobacco: Never   Substance and Sexual Activity    Alcohol use: Not Currently    Drug use: Never    Sexual activity: Yes     Partners: Female     Birth control/protection: Partner-Vasectomy       Family History:   Family History   Problem Relation Age of Onset    Diabetes Mother     Hypertension Mother     Norris's esophagus Mother     Hypertension Father     Hyperlipidemia Maternal Grandmother     Hyperlipidemia Paternal Grandmother     Hyperlipidemia Paternal  Grandfather     Esophageal cancer Maternal Uncle        Review of Systems:  A 10 point review of systems was performed and was normal, except as mentioned in the HPI, including constitutional, HEENT, heme, lymph, cardiovascular, respiratory, gastrointestinal, genitourinary, neurologic, endocrine, psychiatric and musculoskeletal.      OBJECTIVE:    Physical Exam:  24 Hour Vital Sign Ranges:    Most recent vitals: BP (!) 143/94   Pulse 65   Temp 98.2 °F (36.8 °C) (Temporal)   Resp 19   LMP 01/20/2023 (Exact Date) Comment: reflects first day of cycle  SpO2 100%   Breastfeeding No    GEN: well-developed, well-nourished, awake and alert, non-toxic appearing adult  HEENT: PERRL, sclera anicteric, oral mucosa pink and moist without lesion  NECK: trachea midline; Good ROM  CV: regular rate and rhythm, no murmurs or gallops  RESP: clear to auscultation bilaterally, no wheezes, rhonci or rales  ABD: soft, non-tender, non-distended, normal bowel sounds  EXT: no swelling or edema, 2+ pulses distally  SKIN: no rashes or jaundice  PSYCH: normal affect    Labs:   No results for input(s): WBC, MCV, PLT in the last 72 hours.    Invalid input(s): HGBAU  No results for input(s): NA, K, CL, CO2, BUN, GLU in the last 72 hours.    Invalid input(s): CREA  No results for input(s): ALB in the last 72 hours.    Invalid input(s): ALKP, SGOT, SGPT, TBIL, DBIL, TPRO  No results for input(s): PT, INR, PTT in the last 72 hours.      IMPRESSION / RECOMMENDATIONS:  ERCP  with interventions as warranted.   RIsks, benefits, alternatives discussed in detail regarding upcoming procedures and sedation. Some of the more common endoscopic complications include but not limited to immediate or delayed perforation, bleeding, infections, pain, inadvertent injury to surrounding tissue / organs and possible need for surgical evaluation. Patient expressed understanding, all questions answered and will proceed with procedure as planned.     Markel MCDERMOTT  Deirdre III  1/24/23  15:00 PM

## 2023-02-09 ENCOUNTER — HOSPITAL ENCOUNTER (OUTPATIENT)
Dept: RADIOLOGY | Facility: HOSPITAL | Age: 43
Discharge: HOME | End: 2023-02-09
Attending: NURSE PRACTITIONER
Payer: COMMERCIAL

## 2023-02-09 DIAGNOSIS — Z12.31 ENCOUNTER FOR SCREENING MAMMOGRAM FOR MALIGNANT NEOPLASM OF BREAST: ICD-10-CM

## 2023-02-09 PROCEDURE — 77067 SCR MAMMO BI INCL CAD: CPT | Mod: TC,PO

## 2023-07-24 ENCOUNTER — OFFICE VISIT (OUTPATIENT)
Dept: FAMILY MEDICINE | Facility: CLINIC | Age: 43
End: 2023-07-24
Payer: COMMERCIAL

## 2023-07-24 VITALS
HEIGHT: 67 IN | WEIGHT: 267.38 LBS | OXYGEN SATURATION: 98 % | TEMPERATURE: 99 F | HEART RATE: 75 BPM | RESPIRATION RATE: 20 BRPM | BODY MASS INDEX: 41.97 KG/M2 | SYSTOLIC BLOOD PRESSURE: 128 MMHG | DIASTOLIC BLOOD PRESSURE: 72 MMHG

## 2023-07-24 DIAGNOSIS — D50.9 IRON DEFICIENCY ANEMIA, UNSPECIFIED IRON DEFICIENCY ANEMIA TYPE: ICD-10-CM

## 2023-07-24 DIAGNOSIS — F33.1 MODERATE EPISODE OF RECURRENT MAJOR DEPRESSIVE DISORDER: Primary | ICD-10-CM

## 2023-07-24 DIAGNOSIS — I10 ESSENTIAL HYPERTENSION: ICD-10-CM

## 2023-07-24 PROCEDURE — 3078F PR MOST RECENT DIASTOLIC BLOOD PRESSURE < 80 MM HG: ICD-10-PCS | Mod: CPTII,S$GLB,, | Performed by: NURSE PRACTITIONER

## 2023-07-24 PROCEDURE — 1160F PR REVIEW ALL MEDS BY PRESCRIBER/CLIN PHARMACIST DOCUMENTED: ICD-10-PCS | Mod: CPTII,S$GLB,, | Performed by: NURSE PRACTITIONER

## 2023-07-24 PROCEDURE — 3044F PR MOST RECENT HEMOGLOBIN A1C LEVEL <7.0%: ICD-10-PCS | Mod: CPTII,S$GLB,, | Performed by: NURSE PRACTITIONER

## 2023-07-24 PROCEDURE — 4010F ACE/ARB THERAPY RXD/TAKEN: CPT | Mod: CPTII,S$GLB,, | Performed by: NURSE PRACTITIONER

## 2023-07-24 PROCEDURE — 1160F RVW MEDS BY RX/DR IN RCRD: CPT | Mod: CPTII,S$GLB,, | Performed by: NURSE PRACTITIONER

## 2023-07-24 PROCEDURE — 3044F HG A1C LEVEL LT 7.0%: CPT | Mod: CPTII,S$GLB,, | Performed by: NURSE PRACTITIONER

## 2023-07-24 PROCEDURE — 1159F MED LIST DOCD IN RCRD: CPT | Mod: CPTII,S$GLB,, | Performed by: NURSE PRACTITIONER

## 2023-07-24 PROCEDURE — 3074F SYST BP LT 130 MM HG: CPT | Mod: CPTII,S$GLB,, | Performed by: NURSE PRACTITIONER

## 2023-07-24 PROCEDURE — 99214 OFFICE O/P EST MOD 30 MIN: CPT | Mod: S$GLB,,, | Performed by: NURSE PRACTITIONER

## 2023-07-24 PROCEDURE — 3078F DIAST BP <80 MM HG: CPT | Mod: CPTII,S$GLB,, | Performed by: NURSE PRACTITIONER

## 2023-07-24 PROCEDURE — 3008F PR BODY MASS INDEX (BMI) DOCUMENTED: ICD-10-PCS | Mod: CPTII,S$GLB,, | Performed by: NURSE PRACTITIONER

## 2023-07-24 PROCEDURE — 1159F PR MEDICATION LIST DOCUMENTED IN MEDICAL RECORD: ICD-10-PCS | Mod: CPTII,S$GLB,, | Performed by: NURSE PRACTITIONER

## 2023-07-24 PROCEDURE — 99214 PR OFFICE/OUTPT VISIT, EST, LEVL IV, 30-39 MIN: ICD-10-PCS | Mod: S$GLB,,, | Performed by: NURSE PRACTITIONER

## 2023-07-24 PROCEDURE — 3008F BODY MASS INDEX DOCD: CPT | Mod: CPTII,S$GLB,, | Performed by: NURSE PRACTITIONER

## 2023-07-24 PROCEDURE — 4010F PR ACE/ARB THEARPY RXD/TAKEN: ICD-10-PCS | Mod: CPTII,S$GLB,, | Performed by: NURSE PRACTITIONER

## 2023-07-24 PROCEDURE — 3074F PR MOST RECENT SYSTOLIC BLOOD PRESSURE < 130 MM HG: ICD-10-PCS | Mod: CPTII,S$GLB,, | Performed by: NURSE PRACTITIONER

## 2023-07-24 RX ORDER — BUSPIRONE HYDROCHLORIDE 7.5 MG/1
7.5 TABLET ORAL 2 TIMES DAILY PRN
COMMUNITY
End: 2023-10-10 | Stop reason: SDUPTHER

## 2023-07-24 RX ORDER — NORETHINDRONE 0.35 MG/1
1 TABLET ORAL DAILY
COMMUNITY
Start: 2023-07-19

## 2023-07-24 RX ORDER — BUPROPION HYDROCHLORIDE 150 MG/1
150 TABLET ORAL DAILY
Qty: 30 TABLET | Refills: 5 | Status: SHIPPED | OUTPATIENT
Start: 2023-07-24 | End: 2023-09-11

## 2023-07-24 RX ORDER — LORATADINE 10 MG/1
1 TABLET ORAL DAILY
COMMUNITY

## 2023-07-24 RX ORDER — OLMESARTAN MEDOXOMIL 20 MG/1
20 TABLET ORAL DAILY
Qty: 90 TABLET | Refills: 1 | Status: SHIPPED | OUTPATIENT
Start: 2023-07-24 | End: 2024-02-05

## 2023-07-24 NOTE — PROGRESS NOTES
SUBJECTIVE:      Patient ID: Noemi Huang is a 43 y.o. female.    Chief Complaint: Hypertension    Noemi is here for f/u on HTN and anemia today.  She is taking all of her medications as prescribed daily without side effects or complaints.  Blood pressure is below goal and she is feeling better but still having fatigue.  Patient is having some heavy menses/clots due to fibroids- gyn started her on progesterone only pills which have only helped minimally. C/o family/marriage issues and planning on seeing a counselor for depression in the next week. Also wants to discuss medication therapy options. Prescribed Buspar for anxiety but not currently taking.     Hypertension  This is a chronic problem. The current episode started more than 1 year ago. The problem is controlled. Associated symptoms include anxiety and malaise/fatigue. Pertinent negatives include no blurred vision, chest pain, headaches, neck pain, palpitations, peripheral edema or shortness of breath. There are no associated agents to hypertension. Risk factors for coronary artery disease include obesity, sedentary lifestyle, stress and family history. Past treatments include angiotensin blockers and lifestyle changes. The current treatment provides moderate improvement. Compliance problems include diet and exercise.  There is no history of angina, kidney disease, CAD/MI or CVA. There is no history of renovascular disease or a thyroid problem.   Depression  Visit Type: initial  Onset of symptoms: 1 to 6 months ago  Progression since onset: gradually worsening  Patient presents with the following symptoms: depressed mood, fatigue, irritability and nervousness/anxiety (occasional).  Patient is not experiencing: chest pain, choking sensation, confusion, decreased concentration, dizziness, excessive worry, insomnia, nausea, obsessions, palpitations, panic, shortness of breath, suicidal ideas, weight gain and weight loss.  Frequency of symptoms: most  days   Severity: moderate   Aggravated by: work stress and family issues  Sleep quality: fair  Nighttime awakenings: occasional  Risk factors: previous episode of depression  Patient has a history of: anxiety/panic attacks, asthma and depression  No history of: bipolar disorder and hyperthyroidism  Treatment tried: SSRI  Compliance with treatment: good  Improvement on treatment: moderate    Anemia  Presents for follow-up visit. Symptoms include malaise/fatigue. There has been no abdominal pain, anorexia, bruising/bleeding easily, confusion, fever, leg swelling, light-headedness, pallor, palpitations, paresthesias, pica or weight loss. Signs of blood loss that are present include menorrhagia. Signs of blood loss that are not present include hematemesis, hematochezia and melena. There are no compliance problems.  Compliance with medications is %.     Family History   Problem Relation Age of Onset    Diabetes Mother     Hypertension Mother     Norris's esophagus Mother     Hypertension Father     Hyperlipidemia Maternal Grandmother     Hyperlipidemia Paternal Grandmother     Hyperlipidemia Paternal Grandfather     Esophageal cancer Maternal Uncle       Social History     Socioeconomic History    Marital status:    Tobacco Use    Smoking status: Former     Years: 3.00     Types: Cigarettes     Quit date: 2004     Years since quittin.0    Smokeless tobacco: Never   Substance and Sexual Activity    Alcohol use: Not Currently    Drug use: Never    Sexual activity: Yes     Partners: Female     Birth control/protection: Partner-Vasectomy     Current Outpatient Medications   Medication Sig Dispense Refill    albuterol (PROVENTIL/VENTOLIN HFA) 90 mcg/actuation inhaler Inhale 2 puffs into the lungs every 4 (four) hours as needed.      busPIRone (BUSPAR) 7.5 MG tablet Take 7.5 mg by mouth 2 (two) times daily as needed.      LORRIE 0.35 mg tablet Take 1 tablet by mouth once daily.      ferrous sulfate  (FEOSOL) 325 mg (65 mg iron) Tab tablet Take 1 tablet (325 mg total) by mouth daily with breakfast. 30 tablet 2    loratadine (CLARITIN) 10 mg tablet Take 1 tablet by mouth once daily.      omeprazole (PRILOSEC) 20 MG capsule TAKE 1 CAPSULE BY MOUTH EVERY DAY 30 capsule 5    SYMBICORT 80-4.5 mcg/actuation HFAA Inhale 2 puffs into the lungs Daily.      buPROPion (WELLBUTRIN XL) 150 MG TB24 tablet Take 1 tablet (150 mg total) by mouth once daily. 30 tablet 5    olmesartan (BENICAR) 20 MG tablet Take 1 tablet (20 mg total) by mouth once daily. 90 tablet 1     No current facility-administered medications for this visit.     Review of patient's allergies indicates:   Allergen Reactions    Penicillins Anaphylaxis      Past Medical History:   Diagnosis Date    Allergy     Asthma     Chronic GERD     Hypertension      Past Surgical History:   Procedure Laterality Date     SECTION      [id number]-(twins)    CHOLECYSTECTOMY  10/18/2022    DILATION AND CURETTAGE OF UTERUS  2022    ERCP N/A 10/20/2022    Procedure: ERCP (ENDOSCOPIC RETROGRADE CHOLANGIOPANCREATOGRAPHY);  Surgeon: Markel Gilmore III, MD;  Location: [facility];  Service: Endoscopy;  Laterality: N/A;    ERCP N/A 2023    Procedure: ERCP (ENDOSCOPIC RETROGRADE CHOLANGIOPANCREATOGRAPHY);  Surgeon: Markel Gilmore III, MD;  Location: [facility];  Service: Endoscopy;  Laterality: N/A;    KNEE SURGERY Left     meniscus repair       Review of Systems   Constitutional:  Positive for fatigue, irritability and malaise/fatigue. Negative for activity change, appetite change, chills, diaphoresis, fever, unexpected weight change, weight gain and weight loss.   HENT:  Negative for congestion, hearing loss, rhinorrhea, sore throat and trouble swallowing.    Eyes:  Negative for blurred vision, discharge and visual disturbance.   Respiratory:  Negative for cough, choking, chest tightness, shortness of breath and wheezing.    Cardiovascular:  " Negative for chest pain, palpitations and leg swelling.   Gastrointestinal:  Positive for constipation. Negative for abdominal pain, anorexia, blood in stool, diarrhea, hematemesis, hematochezia, melena, nausea and vomiting.        Gerd   Endocrine: Negative for polydipsia and polyuria.        Hair loss- improved    Genitourinary:  Positive for menorrhagia and menstrual problem. Negative for difficulty urinating, dysuria, frequency, hematuria, pelvic pain, vaginal discharge and vaginal pain.   Musculoskeletal:  Negative for arthralgias, joint swelling and neck pain.   Skin:  Negative for pallor.   Neurological:  Negative for dizziness, weakness, light-headedness, numbness, headaches and paresthesias.   Hematological:  Negative for adenopathy. Does not bruise/bleed easily.   Psychiatric/Behavioral:  Positive for depression and dysphoric mood. Negative for confusion, decreased concentration, sleep disturbance and suicidal ideas. The patient is nervous/anxious (occasional). The patient does not have insomnia.     OBJECTIVE:      Vitals:    07/24/23 0928   BP: 128/72   BP Location: Left arm   Patient Position: Sitting   BP Method: Large (Manual)   Pulse: 75   Resp: 20   Temp: 98.8 °F (37.1 °C)   TempSrc: Oral   SpO2: 98%   Weight: 121.3 kg (267 lb 6.4 oz)   Height: 5' 7" (1.702 m)     Physical Exam  Vitals and nursing note reviewed.   Constitutional:       General: She is not in acute distress.     Appearance: Normal appearance. She is well-developed. She is obese. She is not ill-appearing or diaphoretic.   HENT:      Head: Normocephalic.      Right Ear: External ear normal.      Left Ear: External ear normal.      Nose: Nose normal.      Mouth/Throat:      Mouth: Mucous membranes are moist.      Pharynx: Oropharynx is clear.   Eyes:      General: No scleral icterus.     Conjunctiva/sclera: Conjunctivae normal.      Pupils: Pupils are equal, round, and reactive to light.   Neck:      Thyroid: No thyroid mass or " thyromegaly.   Cardiovascular:      Rate and Rhythm: Normal rate and regular rhythm.      Heart sounds: Normal heart sounds. No murmur heard.  Pulmonary:      Effort: Pulmonary effort is normal. No respiratory distress.      Breath sounds: Normal breath sounds. No wheezing, rhonchi or rales.   Abdominal:      Palpations: Abdomen is soft.      Tenderness: There is no abdominal tenderness.   Genitourinary:     Comments:    Musculoskeletal:         General: Normal range of motion.      Cervical back: Normal range of motion and neck supple.      Right lower leg: No edema.      Left lower leg: No edema.   Lymphadenopathy:      Cervical: No cervical adenopathy.   Skin:     General: Skin is warm and dry.      Coloration: Skin is not jaundiced or pale.      Findings: No bruising.   Neurological:      Mental Status: She is alert and oriented to person, place, and time.   Psychiatric:         Mood and Affect: Mood is depressed (mild). Mood is not anxious.         Behavior: Behavior normal.         Thought Content: Thought content normal.         Judgment: Judgment normal.      Assessment:       1. Moderate episode of recurrent major depressive disorder    2. Essential hypertension    3. Iron deficiency anemia, unspecified iron deficiency anemia type        Plan:       Moderate episode of recurrent major depressive disorder  -     buPROPion (WELLBUTRIN XL) 150 MG TB24 tablet; Take 1 tablet (150 mg total) by mouth once daily.  Dispense: 30 tablet; Refill: 5  -Patient completed the PHQ-9 questionnaire with score 9 . Patient will be started on daily antidepressant and was informed that symptom relief may take 2-4 weeks. Patient was encouraged to avoid abrupt cessation of medication and was educated on the potential medication side effects. Exercise was encouraged as an adjunctive treatment. Will follow-up 4 weeks.  Advice pt to go to ER if having SI or HI.  Continue Buspar as needed for anxiety.     Essential hypertension  -      olmesartan (BENICAR) 20 MG tablet; Take 1 tablet (20 mg total) by mouth once daily.  Dispense: 90 tablet; Refill: 1  -stable/controlled; cont meds    Iron deficiency anemia, unspecified iron deficiency anemia type  -     CBC Auto Differential; Future; Expected date: 07/24/2023  -     Ferritin; Future; Expected date: 07/24/2023  -     Iron and TIBC; Future; Expected date: 07/24/2023   -recheck/get labs; see gyn about other options for menorrhagia; cont iron for now       Follow up in about 1 month (around 8/24/2023) for f/u depression .      7/24/2023 NILSON Alvarado, FNP    This note was created using [employer] voice recognition software that occasionally misinterprets phrases or words.

## 2023-08-21 ENCOUNTER — TELEPHONE (OUTPATIENT)
Dept: FAMILY MEDICINE | Facility: CLINIC | Age: 43
End: 2023-08-21

## 2023-09-11 ENCOUNTER — OFFICE VISIT (OUTPATIENT)
Dept: FAMILY MEDICINE | Facility: CLINIC | Age: 43
End: 2023-09-11
Payer: COMMERCIAL

## 2023-09-11 VITALS
SYSTOLIC BLOOD PRESSURE: 132 MMHG | WEIGHT: 263.38 LBS | DIASTOLIC BLOOD PRESSURE: 68 MMHG | RESPIRATION RATE: 20 BRPM | BODY MASS INDEX: 41.34 KG/M2 | HEART RATE: 72 BPM | TEMPERATURE: 99 F | HEIGHT: 67 IN | OXYGEN SATURATION: 98 %

## 2023-09-11 DIAGNOSIS — Z23 NEED FOR INFLUENZA VACCINATION: ICD-10-CM

## 2023-09-11 DIAGNOSIS — F33.1 MODERATE EPISODE OF RECURRENT MAJOR DEPRESSIVE DISORDER: Primary | ICD-10-CM

## 2023-09-11 DIAGNOSIS — I10 ESSENTIAL HYPERTENSION: ICD-10-CM

## 2023-09-11 PROCEDURE — 4010F PR ACE/ARB THEARPY RXD/TAKEN: ICD-10-PCS | Mod: CPTII,S$GLB,, | Performed by: NURSE PRACTITIONER

## 2023-09-11 PROCEDURE — 90686 FLU VACCINE (QUAD) GREATER THAN OR EQUAL TO 3YO PRESERVATIVE FREE IM: ICD-10-PCS | Mod: S$GLB,,, | Performed by: NURSE PRACTITIONER

## 2023-09-11 PROCEDURE — 3075F SYST BP GE 130 - 139MM HG: CPT | Mod: CPTII,S$GLB,, | Performed by: NURSE PRACTITIONER

## 2023-09-11 PROCEDURE — 3008F BODY MASS INDEX DOCD: CPT | Mod: CPTII,S$GLB,, | Performed by: NURSE PRACTITIONER

## 2023-09-11 PROCEDURE — 3044F HG A1C LEVEL LT 7.0%: CPT | Mod: CPTII,S$GLB,, | Performed by: NURSE PRACTITIONER

## 2023-09-11 PROCEDURE — 3075F PR MOST RECENT SYSTOLIC BLOOD PRESS GE 130-139MM HG: ICD-10-PCS | Mod: CPTII,S$GLB,, | Performed by: NURSE PRACTITIONER

## 2023-09-11 PROCEDURE — 90471 FLU VACCINE (QUAD) GREATER THAN OR EQUAL TO 3YO PRESERVATIVE FREE IM: ICD-10-PCS | Mod: S$GLB,,, | Performed by: NURSE PRACTITIONER

## 2023-09-11 PROCEDURE — 99213 PR OFFICE/OUTPT VISIT, EST, LEVL III, 20-29 MIN: ICD-10-PCS | Mod: 25,S$GLB,, | Performed by: NURSE PRACTITIONER

## 2023-09-11 PROCEDURE — 4010F ACE/ARB THERAPY RXD/TAKEN: CPT | Mod: CPTII,S$GLB,, | Performed by: NURSE PRACTITIONER

## 2023-09-11 PROCEDURE — 90686 IIV4 VACC NO PRSV 0.5 ML IM: CPT | Mod: S$GLB,,, | Performed by: NURSE PRACTITIONER

## 2023-09-11 PROCEDURE — 99213 OFFICE O/P EST LOW 20 MIN: CPT | Mod: 25,S$GLB,, | Performed by: NURSE PRACTITIONER

## 2023-09-11 PROCEDURE — 1159F PR MEDICATION LIST DOCUMENTED IN MEDICAL RECORD: ICD-10-PCS | Mod: CPTII,S$GLB,, | Performed by: NURSE PRACTITIONER

## 2023-09-11 PROCEDURE — 1159F MED LIST DOCD IN RCRD: CPT | Mod: CPTII,S$GLB,, | Performed by: NURSE PRACTITIONER

## 2023-09-11 PROCEDURE — 3044F PR MOST RECENT HEMOGLOBIN A1C LEVEL <7.0%: ICD-10-PCS | Mod: CPTII,S$GLB,, | Performed by: NURSE PRACTITIONER

## 2023-09-11 PROCEDURE — 1160F RVW MEDS BY RX/DR IN RCRD: CPT | Mod: CPTII,S$GLB,, | Performed by: NURSE PRACTITIONER

## 2023-09-11 PROCEDURE — 3078F PR MOST RECENT DIASTOLIC BLOOD PRESSURE < 80 MM HG: ICD-10-PCS | Mod: CPTII,S$GLB,, | Performed by: NURSE PRACTITIONER

## 2023-09-11 PROCEDURE — 1160F PR REVIEW ALL MEDS BY PRESCRIBER/CLIN PHARMACIST DOCUMENTED: ICD-10-PCS | Mod: CPTII,S$GLB,, | Performed by: NURSE PRACTITIONER

## 2023-09-11 PROCEDURE — 90471 IMMUNIZATION ADMIN: CPT | Mod: S$GLB,,, | Performed by: NURSE PRACTITIONER

## 2023-09-11 PROCEDURE — 3078F DIAST BP <80 MM HG: CPT | Mod: CPTII,S$GLB,, | Performed by: NURSE PRACTITIONER

## 2023-09-11 PROCEDURE — 3008F PR BODY MASS INDEX (BMI) DOCUMENTED: ICD-10-PCS | Mod: CPTII,S$GLB,, | Performed by: NURSE PRACTITIONER

## 2023-09-11 RX ORDER — BUPROPION HYDROCHLORIDE 300 MG/1
300 TABLET ORAL DAILY
Qty: 90 TABLET | Refills: 1 | Status: SHIPPED | OUTPATIENT
Start: 2023-09-11 | End: 2024-03-07

## 2023-09-11 NOTE — PROGRESS NOTES
SUBJECTIVE:      Patient ID: Noemi Huang is a 43 y.o. female.    Chief Complaint: Depression    Noemi is here for f/u on depression today. She is taking Wellbutrin 150 mg daily without side effects or complaints.  Depression seem to be improving, especially after starting the medication initially.  Her irritability and snapping as around her menstrual cycle seems to be improving as well.  She is able to focus better as an added benefit.  Blood pressure is below goal.  She is attending family counseling with her  in efforts to work on their communication and marriage.  She is planning on following up with gynecology due to heavy menstrual cycle monthly and not improving on progesterone pills.    Depression  Visit Type: initial  Onset of symptoms: 1 to 6 months ago  Progression since onset: gradually worsening  Patient presents with the following symptoms: depressed mood, fatigue, irritability and nervousness/anxiety (occasional).  Patient is not experiencing: chest pain, choking sensation, confusion, decreased concentration, dizziness, excessive worry, insomnia, nausea, obsessions, palpitations, panic, shortness of breath, suicidal ideas, weight gain and weight loss.  Frequency of symptoms: most days   Severity: mild   Aggravated by: work stress and family issues  Sleep quality: good  Nighttime awakenings: occasional  Risk factors: previous episode of depression  Patient has a history of: anxiety/panic attacks, asthma and depression  No history of: bipolar disorder and hyperthyroidism  Treatment tried: SSRI  Compliance with treatment: good  Improvement on treatment: moderate        Family History   Problem Relation Age of Onset    Diabetes Mother     Hypertension Mother     Norris's esophagus Mother     Hypertension Father     Hyperlipidemia Maternal Grandmother     Hyperlipidemia Paternal Grandmother     Hyperlipidemia Paternal Grandfather     Esophageal cancer Maternal Uncle       Social History      Socioeconomic History    Marital status:    Tobacco Use    Smoking status: Former     Current packs/day: 0.00     Types: Cigarettes     Start date: 2001     Quit date: 2004     Years since quittin.1    Smokeless tobacco: Never   Substance and Sexual Activity    Alcohol use: Not Currently    Drug use: Never    Sexual activity: Yes     Partners: Female     Birth control/protection: Partner-Vasectomy     Current Outpatient Medications   Medication Sig Dispense Refill    albuterol (PROVENTIL/VENTOLIN HFA) 90 mcg/actuation inhaler Inhale 2 puffs into the lungs every 4 (four) hours as needed.      busPIRone (BUSPAR) 7.5 MG tablet Take 7.5 mg by mouth 2 (two) times daily as needed.      LORRIE 0.35 mg tablet Take 1 tablet by mouth once daily.      loratadine (CLARITIN) 10 mg tablet Take 1 tablet by mouth once daily.      olmesartan (BENICAR) 20 MG tablet Take 1 tablet (20 mg total) by mouth once daily. 90 tablet 1    omeprazole (PRILOSEC) 20 MG capsule TAKE 1 CAPSULE BY MOUTH EVERY DAY 30 capsule 5    SYMBICORT 80-4.5 mcg/actuation HFAA Inhale 2 puffs into the lungs Daily.      buPROPion (WELLBUTRIN XL) 300 MG 24 hr tablet Take 1 tablet (300 mg total) by mouth once daily. 90 tablet 1     No current facility-administered medications for this visit.     Review of patient's allergies indicates:   Allergen Reactions    Penicillins Anaphylaxis      Past Medical History:   Diagnosis Date    Allergy     Asthma 1986    Chronic GERD     Hypertension      Past Surgical History:   Procedure Laterality Date     SECTION      [id number]-(twins)    CHOLECYSTECTOMY  10/18/2022    DILATION AND CURETTAGE OF UTERUS  2022    ERCP N/A 10/20/2022    Procedure: ERCP (ENDOSCOPIC RETROGRADE CHOLANGIOPANCREATOGRAPHY);  Surgeon: Markel Gilmore III, MD;  Location: [facility];  Service: Endoscopy;  Laterality: N/A;    ERCP N/A 2023    Procedure: ERCP (ENDOSCOPIC RETROGRADE  "CHOLANGIOPANCREATOGRAPHY);  Surgeon: Markel Gilmore III, MD;  Location: [facility];  Service: Endoscopy;  Laterality: N/A;    KNEE SURGERY Left 2002    meniscus repair       Review of Systems   Constitutional:  Positive for irritability. Negative for activity change, chills, fatigue, fever, unexpected weight change, weight gain and weight loss.   HENT:  Negative for congestion, hearing loss, rhinorrhea, sore throat, tinnitus and trouble swallowing.    Eyes:  Negative for discharge and visual disturbance.   Respiratory:  Negative for cough, choking, chest tightness, shortness of breath and wheezing.    Cardiovascular:  Negative for chest pain, palpitations and leg swelling.   Gastrointestinal:  Negative for abdominal pain, constipation, diarrhea, nausea and vomiting.   Endocrine: Negative for polydipsia and polyuria.   Genitourinary:  Positive for menstrual problem. Negative for difficulty urinating, dysuria, frequency, hematuria and pelvic pain.   Musculoskeletal:  Negative for arthralgias, joint swelling and neck pain.   Skin:  Negative for pallor.   Neurological:  Negative for dizziness, weakness, numbness and headaches.   Hematological:  Negative for adenopathy.   Psychiatric/Behavioral:  Negative for confusion, decreased concentration, dysphoric mood, sleep disturbance and suicidal ideas. The patient is nervous/anxious (occasional). The patient does not have insomnia.       OBJECTIVE:      Vitals:    09/11/23 1422   BP: 132/68   BP Location: Left arm   Patient Position: Sitting   BP Method: Large (Manual)   Pulse: 72   Resp: 20   Temp: 98.5 °F (36.9 °C)   TempSrc: Oral   SpO2: 98%   Weight: 119.5 kg (263 lb 6.4 oz)   Height: 5' 7" (1.702 m)     Physical Exam  Vitals and nursing note reviewed.   Constitutional:       General: She is not in acute distress.     Appearance: Normal appearance. She is well-developed. She is obese. She is not ill-appearing or diaphoretic.   HENT:      Head: Normocephalic.      " Mouth/Throat:      Mouth: Mucous membranes are moist.   Eyes:      General: No scleral icterus.     Conjunctiva/sclera: Conjunctivae normal.      Pupils: Pupils are equal, round, and reactive to light.   Neck:      Thyroid: No thyroid mass or thyromegaly.   Cardiovascular:      Rate and Rhythm: Normal rate and regular rhythm.      Heart sounds: Normal heart sounds. No murmur heard.  Pulmonary:      Effort: Pulmonary effort is normal. No respiratory distress.      Breath sounds: Normal breath sounds. No wheezing, rhonchi or rales.   Genitourinary:     Comments:    Musculoskeletal:         General: Normal range of motion.      Cervical back: Normal range of motion and neck supple.   Lymphadenopathy:      Cervical: No cervical adenopathy.   Skin:     General: Skin is warm and dry.   Neurological:      Mental Status: She is alert and oriented to person, place, and time.   Psychiatric:         Mood and Affect: Mood is depressed (mild). Mood is not anxious.         Behavior: Behavior normal.         Thought Content: Thought content normal.         Judgment: Judgment normal.        Assessment:       1. Moderate episode of recurrent major depressive disorder    2. Essential hypertension    3. Need for influenza vaccination        Plan:       Moderate episode of recurrent major depressive disorder  -     buPROPion (WELLBUTRIN XL) 300 MG 24 hr tablet; Take 1 tablet (300 mg total) by mouth once daily.  Dispense: 90 tablet; Refill: 1  -improving on Wellbutrin, patient would like dose increase for better control; may start 300 mg daily, monitor blood pressure and for possible side effects such as palpitations or tachycardia; follow-up at annual visit or sooner if needed, continue counseling    Essential hypertension   -stable/improved on medication; continue daily    Need for influenza vaccination  -     Influenza - Quadrivalent *Preferred* (6 months+) (PF)    I spent a total of 23 minutes on the day of the visit.This includes  face to face time and non-face to face time preparing to see the patient (eg, review of tests), obtaining and/or reviewing separately obtained history, documenting clinical information in the electronic or other health record, independently interpreting results and communicating results to the patient/family/caregiver, or care coordinator.     Follow up in about 4 months (around 1/24/2024) for annual .      9/11/2023 NILSON Alvarado, FNP    This note was created using [employer] voice recognition software that occasionally misinterprets phrases or words.

## 2023-09-12 LAB
BASOPHILS # BLD AUTO: 47 CELLS/UL (ref 0–200)
BASOPHILS NFR BLD AUTO: 0.5 %
EOSINOPHIL # BLD AUTO: 94 CELLS/UL (ref 15–500)
EOSINOPHIL NFR BLD AUTO: 1 %
ERYTHROCYTE [DISTWIDTH] IN BLOOD BY AUTOMATED COUNT: 14 % (ref 11–15)
FERRITIN SERPL-MCNC: 6 NG/ML (ref 16–232)
HCT VFR BLD AUTO: 36.3 % (ref 35–45)
HGB BLD-MCNC: 11.9 G/DL (ref 11.7–15.5)
IRON SATN MFR SERPL: 18 % (CALC) (ref 16–45)
IRON SERPL-MCNC: 65 MCG/DL (ref 40–190)
LYMPHOCYTES # BLD AUTO: 1833 CELLS/UL (ref 850–3900)
LYMPHOCYTES NFR BLD AUTO: 19.5 %
MCH RBC QN AUTO: 27.4 PG (ref 27–33)
MCHC RBC AUTO-ENTMCNC: 32.8 G/DL (ref 32–36)
MCV RBC AUTO: 83.6 FL (ref 80–100)
MONOCYTES # BLD AUTO: 564 CELLS/UL (ref 200–950)
MONOCYTES NFR BLD AUTO: 6 %
NEUTROPHILS # BLD AUTO: 6862 CELLS/UL (ref 1500–7800)
NEUTROPHILS NFR BLD AUTO: 73 %
PLATELET # BLD AUTO: 382 THOUSAND/UL (ref 140–400)
PMV BLD REES-ECKER: 10 FL (ref 7.5–12.5)
RBC # BLD AUTO: 4.34 MILLION/UL (ref 3.8–5.1)
TIBC SERPL-MCNC: 361 MCG/DL (CALC) (ref 250–450)
WBC # BLD AUTO: 9.4 THOUSAND/UL (ref 3.8–10.8)

## 2023-09-19 ENCOUNTER — TELEPHONE (OUTPATIENT)
Dept: FAMILY MEDICINE | Facility: CLINIC | Age: 43
End: 2023-09-19

## 2023-10-10 ENCOUNTER — PATIENT MESSAGE (OUTPATIENT)
Dept: FAMILY MEDICINE | Facility: CLINIC | Age: 43
End: 2023-10-10

## 2023-10-10 DIAGNOSIS — F41.9 ANXIETY DISORDER, UNSPECIFIED TYPE: Primary | ICD-10-CM

## 2023-10-10 RX ORDER — BUSPIRONE HYDROCHLORIDE 7.5 MG/1
7.5 TABLET ORAL 2 TIMES DAILY
Qty: 60 TABLET | Refills: 2 | Status: SHIPPED | OUTPATIENT
Start: 2023-10-10 | End: 2024-01-05

## 2024-01-05 DIAGNOSIS — F41.9 ANXIETY DISORDER, UNSPECIFIED TYPE: ICD-10-CM

## 2024-01-05 RX ORDER — BUSPIRONE HYDROCHLORIDE 7.5 MG/1
7.5 TABLET ORAL 2 TIMES DAILY
Qty: 60 TABLET | Refills: 2 | Status: SHIPPED | OUTPATIENT
Start: 2024-01-05

## 2024-01-10 ENCOUNTER — OFFICE VISIT (OUTPATIENT)
Dept: FAMILY MEDICINE | Facility: CLINIC | Age: 44
End: 2024-01-10
Payer: COMMERCIAL

## 2024-01-10 VITALS
WEIGHT: 268 LBS | DIASTOLIC BLOOD PRESSURE: 73 MMHG | BODY MASS INDEX: 42.06 KG/M2 | RESPIRATION RATE: 16 BRPM | TEMPERATURE: 98 F | HEIGHT: 67 IN | HEART RATE: 78 BPM | SYSTOLIC BLOOD PRESSURE: 130 MMHG | OXYGEN SATURATION: 99 %

## 2024-01-10 DIAGNOSIS — Z12.31 ENCOUNTER FOR SCREENING MAMMOGRAM FOR MALIGNANT NEOPLASM OF BREAST: ICD-10-CM

## 2024-01-10 DIAGNOSIS — F33.1 MODERATE EPISODE OF RECURRENT MAJOR DEPRESSIVE DISORDER: ICD-10-CM

## 2024-01-10 DIAGNOSIS — D50.9 IRON DEFICIENCY ANEMIA, UNSPECIFIED IRON DEFICIENCY ANEMIA TYPE: ICD-10-CM

## 2024-01-10 DIAGNOSIS — I10 ESSENTIAL HYPERTENSION: ICD-10-CM

## 2024-01-10 DIAGNOSIS — F41.9 ANXIETY DISORDER, UNSPECIFIED TYPE: ICD-10-CM

## 2024-01-10 DIAGNOSIS — L73.9 FOLLICULITIS: ICD-10-CM

## 2024-01-10 DIAGNOSIS — Z00.01 ENCOUNTER FOR ROUTINE ADULT HEALTH EXAMINATION WITH ABNORMAL FINDINGS: Primary | ICD-10-CM

## 2024-01-10 PROCEDURE — 99999 PR PBB SHADOW E&M-EST. PATIENT-LVL IV: CPT | Mod: PBBFAC,,, | Performed by: NURSE PRACTITIONER

## 2024-01-10 PROCEDURE — 1159F MED LIST DOCD IN RCRD: CPT | Mod: CPTII,S$GLB,, | Performed by: NURSE PRACTITIONER

## 2024-01-10 PROCEDURE — 3075F SYST BP GE 130 - 139MM HG: CPT | Mod: CPTII,S$GLB,, | Performed by: NURSE PRACTITIONER

## 2024-01-10 PROCEDURE — 4010F ACE/ARB THERAPY RXD/TAKEN: CPT | Mod: CPTII,S$GLB,, | Performed by: NURSE PRACTITIONER

## 2024-01-10 PROCEDURE — 1160F RVW MEDS BY RX/DR IN RCRD: CPT | Mod: CPTII,S$GLB,, | Performed by: NURSE PRACTITIONER

## 2024-01-10 PROCEDURE — 3078F DIAST BP <80 MM HG: CPT | Mod: CPTII,S$GLB,, | Performed by: NURSE PRACTITIONER

## 2024-01-10 PROCEDURE — 3008F BODY MASS INDEX DOCD: CPT | Mod: CPTII,S$GLB,, | Performed by: NURSE PRACTITIONER

## 2024-01-10 PROCEDURE — 99396 PREV VISIT EST AGE 40-64: CPT | Mod: S$GLB,,, | Performed by: NURSE PRACTITIONER

## 2024-01-10 RX ORDER — MUPIROCIN 20 MG/G
OINTMENT TOPICAL 3 TIMES DAILY
Qty: 30 G | Refills: 0 | Status: SHIPPED | OUTPATIENT
Start: 2024-01-10

## 2024-01-10 RX ORDER — VALACYCLOVIR HYDROCHLORIDE 500 MG/1
500 TABLET, FILM COATED ORAL 2 TIMES DAILY
COMMUNITY
Start: 2023-12-30

## 2024-01-10 NOTE — PROGRESS NOTES
SUBJECTIVE:   HPI: Noemi Huang  is a 43 y.o. female who presents for annual physical .   Annual Exam    Noemi is here for annual exam and physical today.  She is taking all of her medications as prescribed daily without side effects or complaints.  Blood pressure is below goal and she is feeling well.  Upcoming mammogram is due- denies breast complaints. Taking birth control pills to control her cycle- reports lighter cycles at times.  Yearly lab work will be ordered.  Has been iron deficient previously d/t menorrhagia and fibroids- taking MVI daily. Anxiety and depression are well-controlled on Wellbutrin and Buspar. Seeing a counselor alone for therapy-  had been unfaithful and she contracted HSV1 from him. No longer sexually active but is on meds prn outbreaks.       (Not in a hospital admission)    Review of patient's allergies indicates:   Allergen Reactions    Penicillins Anaphylaxis    Cat dander Other (See Comments)    House dust Other (See Comments)    Weed pollen-short ragweed Other (See Comments)     Current Outpatient Medications on File Prior to Visit   Medication Sig Dispense Refill    albuterol (PROVENTIL/VENTOLIN HFA) 90 mcg/actuation inhaler Inhale 2 puffs into the lungs every 4 (four) hours as needed.      buPROPion (WELLBUTRIN XL) 300 MG 24 hr tablet Take 1 tablet (300 mg total) by mouth once daily. 90 tablet 1    busPIRone (BUSPAR) 7.5 MG tablet TAKE 1 TABLET BY MOUTH 2 TIMES A DAY. 60 tablet 2    LORRIE 0.35 mg tablet Take 1 tablet by mouth once daily.      loratadine (CLARITIN) 10 mg tablet Take 1 tablet by mouth once daily.      olmesartan (BENICAR) 20 MG tablet Take 1 tablet (20 mg total) by mouth once daily. 90 tablet 1    omeprazole (PRILOSEC) 20 MG capsule TAKE 1 CAPSULE BY MOUTH EVERY DAY 30 capsule 5    SYMBICORT 80-4.5 mcg/actuation HFAA Inhale 2 puffs into the lungs Daily.      valACYclovir (VALTREX) 500 MG tablet Take 500 mg by mouth 2 (two) times daily.       No current  facility-administered medications on file prior to visit.     Past Medical History:   Diagnosis Date    Allergy     Asthma 1986    Chronic GERD     HSV (herpes simplex virus) infection 2023    Hypertension      Past Surgical History:   Procedure Laterality Date     SECTION      [id number]-(twins)    CHOLECYSTECTOMY  10/18/2022    DILATION AND CURETTAGE OF UTERUS  2022    ERCP N/A 10/20/2022    Procedure: ERCP (ENDOSCOPIC RETROGRADE CHOLANGIOPANCREATOGRAPHY);  Surgeon: Markel Gilmore III, MD;  Location: [facility] ENDO;  Service: Endoscopy;  Laterality: N/A;    ERCP N/A 2023    Procedure: ERCP (ENDOSCOPIC RETROGRADE CHOLANGIOPANCREATOGRAPHY);  Surgeon: Markel Gilmore III, MD;  Location: [facility] ENDO;  Service: Endoscopy;  Laterality: N/A;    KNEE SURGERY Left 2002    meniscus repair     Family History   Problem Relation Age of Onset    Diabetes Mother     Hypertension Mother     Norris's esophagus Mother     Hypertension Father     Hyperlipidemia Maternal Grandmother     Hyperlipidemia Paternal Grandmother     Hyperlipidemia Paternal Grandfather     Esophageal cancer Maternal Uncle      Social History     Tobacco Use    Smoking status: Former     Current packs/day: 0.00     Types: Cigarettes     Start date: 2001     Quit date: 2004     Years since quittin.5    Smokeless tobacco: Never   Substance Use Topics    Alcohol use: Not Currently    Drug use: Never      Health Maintenance Topics with due status: Not Due       Topic Last Completion Date    TETANUS VACCINE 2021    Hemoglobin A1c (Diabetic Prevention Screening) 2023    Cervical Cancer Screening 10/13/2023     Immunization History   Administered Date(s) Administered    COVID-19 Vaccine 2021, 2021    COVID-19, MRNA, LN-S, PF (Pfizer) (Purple Cap) 2021, 2021, 2021    Influenza - Quadrivalent - PF *Preferred* (6 months and older) 2018, 2018, 2021,  "01/23/2023, 09/11/2023    Tdap 12/21/2021       Review of Systems   Constitutional:  Negative for activity change, appetite change, chills, diaphoresis, fatigue, fever and unexpected weight change.   HENT:  Negative for congestion, hearing loss, rhinorrhea, sore throat and trouble swallowing.    Eyes:  Negative for discharge and visual disturbance.   Respiratory:  Negative for cough, choking, chest tightness, shortness of breath and wheezing.    Cardiovascular:  Negative for chest pain, palpitations and leg swelling.   Gastrointestinal:  Negative for abdominal pain, blood in stool, constipation, diarrhea, nausea and vomiting.        Gerd   Endocrine: Negative for polydipsia and polyuria.   Genitourinary:  Positive for menstrual problem (heavy menses intermittently). Negative for difficulty urinating, dysuria, frequency, hematuria, pelvic pain, vaginal discharge and vaginal pain.   Musculoskeletal:  Negative for arthralgias, joint swelling and neck pain.   Skin:  Negative for pallor.   Neurological:  Negative for dizziness, weakness, numbness and headaches.   Hematological:  Negative for adenopathy. Does not bruise/bleed easily.   Psychiatric/Behavioral:  Negative for confusion, decreased concentration, dysphoric mood, sleep disturbance and suicidal ideas. The patient is not nervous/anxious.       OBJECTIVE:      Vitals:    01/10/24 0859   BP: 130/73   Pulse: 78   Resp: 16   Temp: 98.2 °F (36.8 °C)   SpO2: 99%   Weight: 121.6 kg (268 lb)   Height: 5' 7" (1.702 m)     Physical Exam  Vitals and nursing note reviewed.   Constitutional:       General: She is not in acute distress.     Appearance: Normal appearance. She is well-developed. She is obese. She is not ill-appearing or diaphoretic.   HENT:      Head: Normocephalic.      Right Ear: Tympanic membrane, ear canal and external ear normal.      Left Ear: Tympanic membrane, ear canal and external ear normal.      Nose: Nose normal.      Mouth/Throat:      Mouth: " Mucous membranes are moist.      Pharynx: Oropharynx is clear. No oropharyngeal exudate.   Eyes:      General: No scleral icterus.     Pupils: Pupils are equal, round, and reactive to light.   Neck:      Thyroid: No thyroid mass or thyromegaly.   Cardiovascular:      Rate and Rhythm: Normal rate and regular rhythm.      Heart sounds: Normal heart sounds. No murmur heard.  Pulmonary:      Effort: Pulmonary effort is normal. No respiratory distress.      Breath sounds: Normal breath sounds. No wheezing, rhonchi or rales.   Abdominal:      General: Bowel sounds are normal.      Palpations: Abdomen is soft.      Tenderness: There is no abdominal tenderness.   Genitourinary:     Comments: Deferred to gyn    Musculoskeletal:         General: Normal range of motion.      Cervical back: Normal range of motion and neck supple.      Right lower leg: No edema.      Left lower leg: No edema.   Lymphadenopathy:      Cervical: No cervical adenopathy.   Skin:     General: Skin is warm and dry.      Coloration: Skin is not jaundiced or pale.      Findings: Erythema (at old belly button ring site- reports some tenderness and white discharge recently; no induration, no abscess, no active drainage) present. No abscess, bruising, rash or wound.          Neurological:      Mental Status: She is alert and oriented to person, place, and time.   Psychiatric:         Mood and Affect: Mood normal.         Behavior: Behavior normal.         Thought Content: Thought content normal.         Judgment: Judgment normal.        Assessment:       1. Encounter for routine adult health examination with abnormal findings    2. Essential hypertension    3. Moderate episode of recurrent major depressive disorder    4. Anxiety disorder, unspecified type    5. Iron deficiency anemia, unspecified iron deficiency anemia type    6. Encounter for screening mammogram for malignant neoplasm of breast    7. Folliculitis    8. BMI 40.0-44.9, adult        Plan:        Encounter for routine adult health examination with abnormal findings   -labs ordered    Essential hypertension  -     CBC Auto Differential; Future; Expected date: 01/10/2024  -     Comprehensive Metabolic Panel; Future; Expected date: 01/10/2024  -     Lipid Panel; Future; Expected date: 01/10/2024  -     TSH; Future; Expected date: 01/10/2024  -     Urinalysis; Future; Expected date: 01/10/2024  -stable, cont meds    Moderate episode of recurrent major depressive disorder   -stable, cont meds    Anxiety disorder, unspecified type   -stable, cont meds    Iron deficiency anemia, unspecified iron deficiency anemia type  -     CBC Auto Differential; Future; Expected date: 01/10/2024  -     Ferritin; Future; Expected date: 01/10/2024  -     Iron and TIBC; Future; Expected date: 01/10/2024  -recheck; on MVI daily     Encounter for screening mammogram for malignant neoplasm of breast  -     Mammo Digital Screening Bilat w/ Thang; Future; Expected date: 01/10/2024    Folliculitis  -     mupirocin (BACTROBAN) 2 % ointment; Apply topically 3 (three) times daily.  Dispense: 30 g; Refill: 0  -warm compresses, top med x 10 days; RTC if worsened     BMI 40.0-44.9, adult        Counseled on age and gender appropriate medical preventative services, including cancer screenings, immunizations, overall nutritional health, need for a consistent exercise regimen and an overall push towards maintaining a vigorous and active lifestyle.      Follow up in about 6 months (around 7/10/2024) for f/u HTN .        This note was created using [employer] voice recognition software that occasionally misinterprets phrases or words.

## 2024-02-04 DIAGNOSIS — I10 ESSENTIAL HYPERTENSION: ICD-10-CM

## 2024-02-04 DIAGNOSIS — K21.9 GASTROESOPHAGEAL REFLUX DISEASE WITHOUT ESOPHAGITIS: ICD-10-CM

## 2024-02-05 RX ORDER — OMEPRAZOLE 20 MG/1
CAPSULE, DELAYED RELEASE ORAL
Qty: 30 CAPSULE | Refills: 5 | Status: SHIPPED | OUTPATIENT
Start: 2024-02-05

## 2024-02-05 RX ORDER — OLMESARTAN MEDOXOMIL 20 MG/1
20 TABLET ORAL
Qty: 30 TABLET | Refills: 5 | Status: SHIPPED | OUTPATIENT
Start: 2024-02-05

## 2024-03-07 DIAGNOSIS — F33.1 MODERATE EPISODE OF RECURRENT MAJOR DEPRESSIVE DISORDER: ICD-10-CM

## 2024-03-07 RX ORDER — BUPROPION HYDROCHLORIDE 300 MG/1
300 TABLET ORAL
Qty: 30 TABLET | Refills: 2 | Status: SHIPPED | OUTPATIENT
Start: 2024-03-07 | End: 2024-05-16

## 2024-04-06 DIAGNOSIS — F41.9 ANXIETY DISORDER, UNSPECIFIED TYPE: ICD-10-CM

## 2024-04-08 RX ORDER — BUSPIRONE HYDROCHLORIDE 7.5 MG/1
7.5 TABLET ORAL 2 TIMES DAILY
Qty: 60 TABLET | Refills: 2 | Status: SHIPPED | OUTPATIENT
Start: 2024-04-08

## 2024-05-16 DIAGNOSIS — F33.1 MODERATE EPISODE OF RECURRENT MAJOR DEPRESSIVE DISORDER: ICD-10-CM

## 2024-05-16 RX ORDER — BUPROPION HYDROCHLORIDE 300 MG/1
300 TABLET ORAL
Qty: 30 TABLET | Refills: 2 | Status: SHIPPED | OUTPATIENT
Start: 2024-05-16

## 2024-07-05 DIAGNOSIS — F41.9 ANXIETY DISORDER, UNSPECIFIED TYPE: ICD-10-CM

## 2024-07-08 RX ORDER — BUSPIRONE HYDROCHLORIDE 7.5 MG/1
7.5 TABLET ORAL 2 TIMES DAILY
Qty: 60 TABLET | Refills: 2 | Status: SHIPPED | OUTPATIENT
Start: 2024-07-08